# Patient Record
Sex: FEMALE | NOT HISPANIC OR LATINO
[De-identification: names, ages, dates, MRNs, and addresses within clinical notes are randomized per-mention and may not be internally consistent; named-entity substitution may affect disease eponyms.]

---

## 2018-11-27 ENCOUNTER — CHARTING TRANS (OUTPATIENT)
Dept: OTHER | Age: 23
End: 2018-11-27

## 2018-12-17 ENCOUNTER — HOSPITAL (OUTPATIENT)
Dept: OTHER | Age: 23
End: 2018-12-17
Attending: PSYCHIATRY & NEUROLOGY

## 2018-12-18 ENCOUNTER — TELEPHONE (OUTPATIENT)
Dept: NEUROLOGY | Age: 23
End: 2018-12-18

## 2019-02-06 VITALS
DIASTOLIC BLOOD PRESSURE: 66 MMHG | WEIGHT: 58.95 LBS | HEART RATE: 70 BPM | SYSTOLIC BLOOD PRESSURE: 105 MMHG | HEIGHT: 62 IN | BODY MASS INDEX: 10.85 KG/M2

## 2019-06-03 ENCOUNTER — OFFICE VISIT (OUTPATIENT)
Dept: INTERNAL MEDICINE CLINIC | Facility: CLINIC | Age: 24
End: 2019-06-03
Payer: COMMERCIAL

## 2019-06-03 VITALS
DIASTOLIC BLOOD PRESSURE: 60 MMHG | BODY MASS INDEX: 24.66 KG/M2 | WEIGHT: 134 LBS | SYSTOLIC BLOOD PRESSURE: 100 MMHG | OXYGEN SATURATION: 98 % | HEART RATE: 84 BPM | RESPIRATION RATE: 19 BRPM | TEMPERATURE: 98 F | HEIGHT: 62 IN

## 2019-06-03 DIAGNOSIS — Z3A.29 29 WEEKS GESTATION OF PREGNANCY: ICD-10-CM

## 2019-06-03 DIAGNOSIS — Z87.19 HISTORY OF GASTROESOPHAGEAL REFLUX (GERD): ICD-10-CM

## 2019-06-03 DIAGNOSIS — I34.1 MITRAL VALVE PROLAPSE: Primary | ICD-10-CM

## 2019-06-03 DIAGNOSIS — Z87.898 HISTORY OF VERTIGO: ICD-10-CM

## 2019-06-03 DIAGNOSIS — R00.2 PALPITATIONS: ICD-10-CM

## 2019-06-03 PROCEDURE — 99213 OFFICE O/P EST LOW 20 MIN: CPT | Performed by: INTERNAL MEDICINE

## 2019-06-03 RX ORDER — PRENATAL VIT/IRON FUM/FOLIC AC 27MG-0.8MG
1 TABLET ORAL DAILY
COMMUNITY
End: 2020-01-07

## 2019-06-03 NOTE — PROGRESS NOTES
Namrata Melo is a 30year old female. Patient presents with:  Establish Care: New pt in to establish care       HPI:       Cally Evan works as a . She is seven months pregnant. She has a history of mitral valve prolapse.   She of Alcohol use: Not Currently    Drug use: Never         Review of System:  CONSTITUTION: denies fevers,  chills, or sweats  HEENT: denies sore throat,  change in vision or hearing  CARDIOVASCULAR: denies chest pain, denies palpitations, denies edema  RESPIRA Miguel Cedeno.    (R00.2) Palpitations  Plan: BASIC METABOLIC PANEL (8), MAGNESIUM, EKG         12-LEAD            (W47.426) History of vertigo  Plan: Dizziness, acute onset by history with possible vasovagal etiology but will also consider hypoglycemia, arrhythmia.

## 2019-06-22 ENCOUNTER — HOSPITAL ENCOUNTER (OUTPATIENT)
Dept: CV DIAGNOSTICS | Facility: HOSPITAL | Age: 24
Discharge: HOME | End: 2019-06-22
Attending: INTERNAL MEDICINE
Payer: COMMERCIAL

## 2019-06-22 ENCOUNTER — APPOINTMENT (OUTPATIENT)
Dept: LAB | Facility: HOSPITAL | Age: 24
End: 2019-06-22
Attending: INTERNAL MEDICINE
Payer: COMMERCIAL

## 2019-06-22 DIAGNOSIS — I34.1 MITRAL VALVE PROLAPSE: ICD-10-CM

## 2019-06-22 DIAGNOSIS — R00.2 PALPITATIONS: ICD-10-CM

## 2019-06-22 DIAGNOSIS — Z3A.29 29 WEEKS GESTATION OF PREGNANCY: ICD-10-CM

## 2019-06-22 PROCEDURE — 93306 TTE W/DOPPLER COMPLETE: CPT | Performed by: INTERNAL MEDICINE

## 2019-06-22 PROCEDURE — 83735 ASSAY OF MAGNESIUM: CPT

## 2019-06-22 PROCEDURE — 93010 ELECTROCARDIOGRAM REPORT: CPT | Performed by: INTERNAL MEDICINE

## 2019-06-22 PROCEDURE — 93005 ELECTROCARDIOGRAM TRACING: CPT

## 2019-06-22 PROCEDURE — 80048 BASIC METABOLIC PNL TOTAL CA: CPT

## 2019-06-22 PROCEDURE — 36415 COLL VENOUS BLD VENIPUNCTURE: CPT

## 2019-06-23 ENCOUNTER — HOSPITAL SCAN (OUTPATIENT)
Dept: HEALTH INFORMATION MANAGEMENT | Facility: OTHER | Age: 24
End: 2019-06-23

## 2019-07-01 ENCOUNTER — OFFICE VISIT (OUTPATIENT)
Dept: INTERNAL MEDICINE CLINIC | Facility: CLINIC | Age: 24
End: 2019-07-01
Payer: COMMERCIAL

## 2019-07-01 VITALS
TEMPERATURE: 98 F | SYSTOLIC BLOOD PRESSURE: 110 MMHG | DIASTOLIC BLOOD PRESSURE: 60 MMHG | WEIGHT: 139 LBS | BODY MASS INDEX: 25.58 KG/M2 | RESPIRATION RATE: 19 BRPM | HEIGHT: 62 IN | HEART RATE: 80 BPM | OXYGEN SATURATION: 97 %

## 2019-07-01 DIAGNOSIS — I37.1 PULMONARY VALVE INSUFFICIENCY, UNSPECIFIED ETIOLOGY: ICD-10-CM

## 2019-07-01 DIAGNOSIS — E83.51 HYPOCALCEMIA: ICD-10-CM

## 2019-07-01 DIAGNOSIS — I34.0 MITRAL VALVE INSUFFICIENCY, UNSPECIFIED ETIOLOGY: Primary | ICD-10-CM

## 2019-07-01 DIAGNOSIS — Z3A.33 33 WEEKS GESTATION OF PREGNANCY: ICD-10-CM

## 2019-07-01 PROCEDURE — 99214 OFFICE O/P EST MOD 30 MIN: CPT | Performed by: INTERNAL MEDICINE

## 2019-07-01 NOTE — PROGRESS NOTES
Claudia Ash is a 30year old female. Patient presents with: Follow - Up: pt in for follow up on Echo + lab results       HPI:       Randee Sapna comes for follow-up of echocardiogram and labs. She feels well.   She has an appointment with her gynec denies nausea, emesis  : denies dysuria  MUSCULOSKELETAL: no c/o  SKIN: no problems noted  NEUROLOGICAL: no focal deficits  HEME: no unusual bleeding    Physical Exam:   07/01/19  1121   BP: 110/60   Pulse: 80   Resp: 19   Temp: 98 °F (36.7 °C)   TempSrc state, though patient also given cardiology consultation referral for expert review.    (Z3A.33) 33 weeks gestation of pregnancy  Plan: Jules Mckinnon 770        Cardiology consult given at hospital patient delivering at,tho no intervention needed at this t

## 2019-07-02 ENCOUNTER — TELEPHONE (OUTPATIENT)
Dept: CARDIOLOGY | Age: 24
End: 2019-07-02

## 2019-07-02 PROBLEM — E83.51 HYPOCALCEMIA: Status: ACTIVE | Noted: 2019-07-02

## 2019-07-02 PROBLEM — I37.1 PULMONARY VALVE INSUFFICIENCY: Status: ACTIVE | Noted: 2019-07-02

## 2019-07-02 PROBLEM — I34.0 MITRAL VALVE INSUFFICIENCY: Status: ACTIVE | Noted: 2019-07-02

## 2019-10-28 ENCOUNTER — OFFICE VISIT (OUTPATIENT)
Dept: INTERNAL MEDICINE CLINIC | Facility: CLINIC | Age: 24
End: 2019-10-28
Payer: COMMERCIAL

## 2019-10-28 VITALS
SYSTOLIC BLOOD PRESSURE: 120 MMHG | HEART RATE: 73 BPM | OXYGEN SATURATION: 97 % | WEIGHT: 126 LBS | RESPIRATION RATE: 18 BRPM | BODY MASS INDEX: 23.19 KG/M2 | DIASTOLIC BLOOD PRESSURE: 78 MMHG | HEIGHT: 62 IN

## 2019-10-28 DIAGNOSIS — R10.32 BILATERAL LOWER ABDOMINAL DISCOMFORT: ICD-10-CM

## 2019-10-28 DIAGNOSIS — I37.1 PULMONARY VALVE INSUFFICIENCY, UNSPECIFIED ETIOLOGY: ICD-10-CM

## 2019-10-28 DIAGNOSIS — E83.51 HYPOCALCEMIA: ICD-10-CM

## 2019-10-28 DIAGNOSIS — F32.A MILD EPISODE OF DEPRESSION: ICD-10-CM

## 2019-10-28 DIAGNOSIS — R10.31 BILATERAL LOWER ABDOMINAL DISCOMFORT: ICD-10-CM

## 2019-10-28 DIAGNOSIS — I34.0 MITRAL VALVE INSUFFICIENCY, UNSPECIFIED ETIOLOGY: ICD-10-CM

## 2019-10-28 DIAGNOSIS — R10.13 EPIGASTRIC DISCOMFORT: Primary | ICD-10-CM

## 2019-10-28 DIAGNOSIS — K62.5 BRBPR (BRIGHT RED BLOOD PER RECTUM): ICD-10-CM

## 2019-10-28 PROBLEM — Z87.898 HISTORY OF VERTIGO: Status: ACTIVE | Noted: 2019-10-28

## 2019-10-28 PROCEDURE — 82306 VITAMIN D 25 HYDROXY: CPT | Performed by: INTERNAL MEDICINE

## 2019-10-28 PROCEDURE — 83970 ASSAY OF PARATHORMONE: CPT | Performed by: INTERNAL MEDICINE

## 2019-10-28 PROCEDURE — 82746 ASSAY OF FOLIC ACID SERUM: CPT | Performed by: INTERNAL MEDICINE

## 2019-10-28 PROCEDURE — 84439 ASSAY OF FREE THYROXINE: CPT | Performed by: INTERNAL MEDICINE

## 2019-10-28 PROCEDURE — 99395 PREV VISIT EST AGE 18-39: CPT | Performed by: INTERNAL MEDICINE

## 2019-10-28 PROCEDURE — 82330 ASSAY OF CALCIUM: CPT | Performed by: INTERNAL MEDICINE

## 2019-10-28 PROCEDURE — 82607 VITAMIN B-12: CPT | Performed by: INTERNAL MEDICINE

## 2019-10-28 PROCEDURE — 84443 ASSAY THYROID STIM HORMONE: CPT | Performed by: INTERNAL MEDICINE

## 2019-10-28 PROCEDURE — 84100 ASSAY OF PHOSPHORUS: CPT | Performed by: INTERNAL MEDICINE

## 2019-10-28 PROCEDURE — 81003 URINALYSIS AUTO W/O SCOPE: CPT | Performed by: INTERNAL MEDICINE

## 2019-10-28 PROCEDURE — 83735 ASSAY OF MAGNESIUM: CPT | Performed by: INTERNAL MEDICINE

## 2019-10-28 NOTE — PATIENT INSTRUCTIONS
Pepcid or Prilosec or omeprazole for nausea and upset stomach, 20 md 20-30 min before breakfast and/or dinner   Exercise regularly  Avoid sweets

## 2019-10-28 NOTE — PROGRESS NOTES
Valeria Gao is a 30year old female. Patient presents with:  Physical: Annual exam today. HPI:         Feels like something  In throat, coughs up hard chunk, then feeling gone. Unable to tell if this is mucous or food.  No trouble swallowing Diabetes Paternal Grandfather       Social History    Tobacco Use      Smoking status: Never Smoker      Smokeless tobacco: Never Used    Alcohol use: Not Currently    Drug use: Never         Review of System:  CONSTITUTION: denies fevers,  chills, or swea - 8.0    Protein Urine neg Negative/Trace mg/dL    Urobilinogen Urine 0.2 0.0 - 1.9 mg/dL    Nitrite Urine neg Negative    Leukocyte Esterase Urine neg Negative    APPEARANCE clear Clear    Color Urine yellow Yellow    Multistix Lot# [id number] Numeric    Mul PATIENT,         MAGNESIUM        Hypocalcemia on previous labs.   To check above, check thyroid functions    '        Imaging & Consults:  CARDIO - [address] for this Visit:   Requested Prescriptions      No prescription

## 2019-11-25 ENCOUNTER — OFFICE VISIT (OUTPATIENT)
Dept: INTERNAL MEDICINE CLINIC | Facility: CLINIC | Age: 24
End: 2019-11-25
Payer: COMMERCIAL

## 2019-11-25 VITALS
OXYGEN SATURATION: 98 % | DIASTOLIC BLOOD PRESSURE: 78 MMHG | RESPIRATION RATE: 19 BRPM | HEIGHT: 62 IN | SYSTOLIC BLOOD PRESSURE: 120 MMHG | BODY MASS INDEX: 22.26 KG/M2 | WEIGHT: 121 LBS | HEART RATE: 89 BPM | TEMPERATURE: 98 F

## 2019-11-25 DIAGNOSIS — E83.52 HYPERCALCEMIA: ICD-10-CM

## 2019-11-25 DIAGNOSIS — R10.32 BILATERAL LOWER ABDOMINAL DISCOMFORT: ICD-10-CM

## 2019-11-25 DIAGNOSIS — I34.0 MITRAL VALVE INSUFFICIENCY, UNSPECIFIED ETIOLOGY: ICD-10-CM

## 2019-11-25 DIAGNOSIS — K62.5 BRBPR (BRIGHT RED BLOOD PER RECTUM): ICD-10-CM

## 2019-11-25 DIAGNOSIS — R10.13 EPIGASTRIC DISCOMFORT: Primary | ICD-10-CM

## 2019-11-25 DIAGNOSIS — R10.31 BILATERAL LOWER ABDOMINAL DISCOMFORT: ICD-10-CM

## 2019-11-25 DIAGNOSIS — R00.2 PALPITATIONS: ICD-10-CM

## 2019-11-25 DIAGNOSIS — Z87.19 HISTORY OF GASTROESOPHAGEAL REFLUX (GERD): ICD-10-CM

## 2019-11-25 DIAGNOSIS — E55.9 VITAMIN D DEFICIENCY: ICD-10-CM

## 2019-11-25 PROCEDURE — 99214 OFFICE O/P EST MOD 30 MIN: CPT | Performed by: INTERNAL MEDICINE

## 2019-11-25 PROCEDURE — 82274 ASSAY TEST FOR BLOOD FECAL: CPT | Performed by: INTERNAL MEDICINE

## 2019-11-25 RX ORDER — ALBUTEROL SULFATE 90 UG/1
AEROSOL, METERED RESPIRATORY (INHALATION)
COMMUNITY
End: 2020-01-20

## 2019-11-25 NOTE — PROGRESS NOTES
Salvador Andujar is a 30year old female. Patient presents with:   Follow - Up: pt in for follow up       HPI:         Ej complaints of bright red blood per rectum recently also, usually less than 1 teaspoon, often associated with lower abdominal di Paternal Grandfather       Social History    Tobacco Use      Smoking status: Never Smoker      Smokeless tobacco: Never Used    Alcohol use: Not Currently    Drug use: Never         Review of System:  CONSTITUTION: denies fevers,  chills, or sweats   HEEN consultation    (K62.5) BRBPR (bright red blood per rectum)  Plan: GI referral, especially due to associated hypogastric discomfort with bleeding. Patient does not wish rectal exam, tho told she will get this future.    (R10.31,  R10.32) Bilateral lower abd

## 2019-12-06 ENCOUNTER — APPOINTMENT (OUTPATIENT)
Dept: LAB | Facility: HOSPITAL | Age: 24
End: 2019-12-06
Attending: INTERNAL MEDICINE
Payer: COMMERCIAL

## 2019-12-06 DIAGNOSIS — R10.13 EPIGASTRIC DISCOMFORT: ICD-10-CM

## 2019-12-06 PROCEDURE — 87338 HPYLORI STOOL AG IA: CPT

## 2020-01-07 PROBLEM — R00.2 PALPITATIONS: Status: ACTIVE | Noted: 2020-01-07

## 2020-01-20 ENCOUNTER — OFFICE VISIT (OUTPATIENT)
Dept: INTERNAL MEDICINE CLINIC | Facility: CLINIC | Age: 25
End: 2020-01-20
Payer: COMMERCIAL

## 2020-01-20 VITALS
HEIGHT: 62 IN | TEMPERATURE: 97 F | SYSTOLIC BLOOD PRESSURE: 110 MMHG | WEIGHT: 120 LBS | OXYGEN SATURATION: 98 % | HEART RATE: 80 BPM | BODY MASS INDEX: 22.08 KG/M2 | DIASTOLIC BLOOD PRESSURE: 64 MMHG

## 2020-01-20 DIAGNOSIS — Z87.19 HISTORY OF GASTROESOPHAGEAL REFLUX (GERD): ICD-10-CM

## 2020-01-20 DIAGNOSIS — Z23 IMMUNIZATION DUE: ICD-10-CM

## 2020-01-20 DIAGNOSIS — K62.5 BRBPR (BRIGHT RED BLOOD PER RECTUM): ICD-10-CM

## 2020-01-20 DIAGNOSIS — R10.32 BILATERAL LOWER ABDOMINAL DISCOMFORT: ICD-10-CM

## 2020-01-20 DIAGNOSIS — E83.51 HYPOCALCEMIA: Primary | ICD-10-CM

## 2020-01-20 DIAGNOSIS — I34.1 MITRAL VALVE PROLAPSE: ICD-10-CM

## 2020-01-20 DIAGNOSIS — R10.31 BILATERAL LOWER ABDOMINAL DISCOMFORT: ICD-10-CM

## 2020-01-20 PROCEDURE — 99214 OFFICE O/P EST MOD 30 MIN: CPT | Performed by: INTERNAL MEDICINE

## 2020-01-20 RX ORDER — ALBUTEROL SULFATE 90 UG/1
AEROSOL, METERED RESPIRATORY (INHALATION)
Qty: 1 INHALER | Refills: 2 | Status: SHIPPED | OUTPATIENT
Start: 2020-01-20 | End: 2020-12-07

## 2020-01-20 NOTE — PROGRESS NOTES
Jarvis Senior is a 30year old female. Patient presents with: Follow - Up: pt in for follow up visit       HPI:         Works in veterinary office, 4 days weekly. [de-identified] 5 months old, doing well. Patient feels well.       Wishes female GI physicia denies fevers,  chills, or sweats; no dizziness  HEENT: denies sore throat,  change in vision or hearing  CARDIOVASCULAR: denies chest pain, denies palpitations  RESPIRATORY: denies shortness of breath, denies wheezing  GASTROINTESTINAL: denies abdominal p Patient agrees to make appointment with GI for evaluation of this also    (Z23) Immunization due  Plan: FLULAVAL INFLUENZA VACCINE QUAD PRESERVATIVE         FREE 0.5 ML                Imaging & Consults:  FLULAVAL INFLUENZA VACCINE QUAD PRESERVATIVE FREE 0

## 2020-01-21 PROBLEM — R00.2 PALPITATIONS: Status: RESOLVED | Noted: 2020-01-07 | Resolved: 2020-01-21

## 2020-01-27 ENCOUNTER — OFFICE VISIT (OUTPATIENT)
Dept: INTERNAL MEDICINE CLINIC | Facility: CLINIC | Age: 25
End: 2020-01-27
Payer: COMMERCIAL

## 2020-01-27 VITALS
WEIGHT: 122 LBS | HEIGHT: 62 IN | SYSTOLIC BLOOD PRESSURE: 106 MMHG | HEART RATE: 88 BPM | OXYGEN SATURATION: 100 % | TEMPERATURE: 99 F | BODY MASS INDEX: 22.45 KG/M2 | DIASTOLIC BLOOD PRESSURE: 62 MMHG

## 2020-01-27 DIAGNOSIS — R11.0 NAUSEA: ICD-10-CM

## 2020-01-27 DIAGNOSIS — R09.81 SINUS CONGESTION: ICD-10-CM

## 2020-01-27 DIAGNOSIS — J11.1 INFLUENZA: Primary | ICD-10-CM

## 2020-01-27 PROCEDURE — 99213 OFFICE O/P EST LOW 20 MIN: CPT | Performed by: INTERNAL MEDICINE

## 2020-01-27 RX ORDER — OSELTAMIVIR PHOSPHATE 75 MG/1
75 CAPSULE ORAL 2 TIMES DAILY
Qty: 10 CAPSULE | Refills: 0 | Status: SHIPPED | OUTPATIENT
Start: 2020-01-27 | End: 2020-03-23 | Stop reason: ALTCHOICE

## 2020-01-27 RX ORDER — AMPICILLIN 500 MG/1
500 CAPSULE ORAL 3 TIMES DAILY
Qty: 30 CAPSULE | Refills: 0 | Status: SHIPPED | OUTPATIENT
Start: 2020-01-27 | End: 2020-03-23 | Stop reason: ALTCHOICE

## 2020-01-27 NOTE — PROGRESS NOTES
Eneida Walton is a 30year old female. Patient presents with:  Sinusitis: pt in c/o sinus pressure, headache, fever, body aches since last night       HPI:       Yesterday had burning feeling right side nose, then temp to 100.  Nause status: Never Smoker      Smokeless tobacco: Never Used    Alcohol use: Not Currently    Drug use: Never         Review of System:  CONSTITUTION:as above  HEENT: as above  CARDIOVASCULAR: denies chest pain,  palpitations,  RESPIRATORY:no cough, no shortnes ampicillin 500 MG Oral Cap 30 capsule 0     Sig: Take 1 capsule (500 mg total) by mouth 3 (three) times daily. • Oseltamivir Phosphate (TAMIFLU) 75 MG Oral Cap 10 capsule 0     Sig: Take 1 capsule (75 mg total) by mouth 2 (two) times daily.        Instruc

## 2020-01-28 ENCOUNTER — TELEPHONE (OUTPATIENT)
Dept: INTERNAL MEDICINE CLINIC | Facility: CLINIC | Age: 25
End: 2020-01-28

## 2020-01-28 NOTE — TELEPHONE ENCOUNTER
Spoke with pt. C/o nausea and fever this morning of 100. Pt took tamiflu and ampicillin last night 1 hour apart w/ a bowl of soup. Pt woke up with stomach pain this morning, hasn't had anything to eat, and has not taken any meds.    Please call pt and advi

## 2020-01-28 NOTE — TELEPHONE ENCOUNTER
Pt believe she is having a reaction to her Rx that she was given yesterday, and Mom called but is not sure of which one. Please give her a call because she is having stomach pains.

## 2020-01-30 ENCOUNTER — PATIENT MESSAGE (OUTPATIENT)
Dept: INTERNAL MEDICINE CLINIC | Facility: CLINIC | Age: 25
End: 2020-01-30

## 2020-03-23 ENCOUNTER — OFFICE VISIT (OUTPATIENT)
Dept: INTERNAL MEDICINE CLINIC | Facility: CLINIC | Age: 25
End: 2020-03-23
Payer: COMMERCIAL

## 2020-03-23 VITALS
DIASTOLIC BLOOD PRESSURE: 60 MMHG | HEART RATE: 83 BPM | OXYGEN SATURATION: 99 % | BODY MASS INDEX: 22.45 KG/M2 | RESPIRATION RATE: 19 BRPM | WEIGHT: 122 LBS | SYSTOLIC BLOOD PRESSURE: 102 MMHG | HEIGHT: 62 IN | TEMPERATURE: 98 F

## 2020-03-23 DIAGNOSIS — F41.9 ANXIETY: ICD-10-CM

## 2020-03-23 DIAGNOSIS — Z87.19 HISTORY OF GASTROESOPHAGEAL REFLUX (GERD): ICD-10-CM

## 2020-03-23 DIAGNOSIS — I34.1 MITRAL VALVE PROLAPSE: ICD-10-CM

## 2020-03-23 DIAGNOSIS — M79.18 MUSCULOSKELETAL PAIN: Primary | ICD-10-CM

## 2020-03-23 PROBLEM — J45.20 MILD INTERMITTENT ASTHMA WITHOUT COMPLICATION (HCC): Status: ACTIVE | Noted: 2020-03-23

## 2020-03-23 PROBLEM — J45.20 MILD INTERMITTENT ASTHMA WITHOUT COMPLICATION: Status: ACTIVE | Noted: 2020-03-23

## 2020-03-23 PROCEDURE — 99213 OFFICE O/P EST LOW 20 MIN: CPT | Performed by: INTERNAL MEDICINE

## 2020-03-23 RX ORDER — FAMOTIDINE 20 MG/1
20 TABLET ORAL
COMMUNITY
End: 2020-07-15 | Stop reason: ALTCHOICE

## 2020-03-23 NOTE — PROGRESS NOTES
Francisco J Bella is a 30year old female.   Patient presents with:  Breast Pain: pt in c./o pain under left breast only when breaths in       HPI:        For 2 weeks had pain under medial left breast, sharp, stabbing pain, when inhales, yawns, or or i Diabetes Maternal Grandfather    • Diabetes Paternal Grandmother    • Diabetes Paternal Grandfather       Social History    Tobacco Use      Smoking status: Never Smoker      Smokeless tobacco: Never Used    Alcohol use: Not Currently    Drug use: Never Assessment/Plan:    (M79.18) Musculoskeletal pain  (primary encounter diagnosis)  Plan: Pain likely due to torn cartilage, musculoskeletal etiology. No evidence of pleurisy with rub.   Advised Tylenol as needed, call if no resolution though may take

## 2020-05-26 ENCOUNTER — TELEMEDICINE (OUTPATIENT)
Dept: INTERNAL MEDICINE CLINIC | Facility: CLINIC | Age: 25
End: 2020-05-26

## 2020-05-26 DIAGNOSIS — R21 RASH AND NONSPECIFIC SKIN ERUPTION: Primary | ICD-10-CM

## 2020-05-26 PROCEDURE — 99213 OFFICE O/P EST LOW 20 MIN: CPT | Performed by: INTERNAL MEDICINE

## 2020-05-26 RX ORDER — PREDNISONE 10 MG/1
TABLET ORAL
Qty: 6 TABLET | Refills: 0 | Status: SHIPPED | OUTPATIENT
Start: 2020-05-26 | End: 2021-06-18 | Stop reason: ALTCHOICE

## 2020-05-26 NOTE — PROGRESS NOTES
Arian Guillen is a 30year old female. No chief complaint on file. HPI:         This is of video visit.     Patient was outside yesterday for approximately 20 minutes; weather was very warm, marion, humid, in upper 80s-felt very nauseated last • Recurrent UTI     less often mow rthan in past      Past Surgical History:   Procedure Laterality Date   • HC IMPLANT EAR TUBES      from mastoiditis      Family History   Problem Relation Age of Onset   • Hypertension Father    • Hypertension Mother and nonspecific skin eruption  (primary encounter diagnosis)    Diffuse rash, mainly arms and legs though also trunk, with no specific inciting etiology although patient was out in the sun with heat and humidity.   No exposure to poison ivy or poison oak no

## 2020-07-13 ENCOUNTER — OFFICE VISIT (OUTPATIENT)
Dept: INTERNAL MEDICINE CLINIC | Facility: CLINIC | Age: 25
End: 2020-07-13
Payer: COMMERCIAL

## 2020-07-13 VITALS
BODY MASS INDEX: 22.63 KG/M2 | TEMPERATURE: 97 F | OXYGEN SATURATION: 98 % | HEIGHT: 62 IN | WEIGHT: 123 LBS | SYSTOLIC BLOOD PRESSURE: 106 MMHG | RESPIRATION RATE: 19 BRPM | HEART RATE: 70 BPM | DIASTOLIC BLOOD PRESSURE: 66 MMHG

## 2020-07-13 DIAGNOSIS — K21.9 GASTROESOPHAGEAL REFLUX DISEASE, ESOPHAGITIS PRESENCE NOT SPECIFIED: Primary | ICD-10-CM

## 2020-07-13 DIAGNOSIS — I34.0 MITRAL VALVE INSUFFICIENCY, UNSPECIFIED ETIOLOGY: ICD-10-CM

## 2020-07-13 DIAGNOSIS — M54.12 CERVICAL RADICULOPATHY: ICD-10-CM

## 2020-07-13 PROCEDURE — 3078F DIAST BP <80 MM HG: CPT | Performed by: INTERNAL MEDICINE

## 2020-07-13 PROCEDURE — 99214 OFFICE O/P EST MOD 30 MIN: CPT | Performed by: INTERNAL MEDICINE

## 2020-07-13 PROCEDURE — 3008F BODY MASS INDEX DOCD: CPT | Performed by: INTERNAL MEDICINE

## 2020-07-13 PROCEDURE — 3074F SYST BP LT 130 MM HG: CPT | Performed by: INTERNAL MEDICINE

## 2020-07-13 RX ORDER — MOMETASONE 50 UG/1
2 SPRAY, METERED NASAL DAILY
Qty: 1 BOTTLE | Refills: 0 | Status: SHIPPED | OUTPATIENT
Start: 2020-07-13 | End: 2020-08-10

## 2020-07-13 NOTE — PROGRESS NOTES
Anna Topete is a 30year old female. Patient presents with: Follow - Up: pt in for 6 month follow up      HPI:         Still GERD, epig pain morning and evening. Wakes up with sore throat, with stuffy nose.   Allergy pollen  Famotidine PRN  Men Past Surgical History:   Procedure Laterality Date   • HC IMPLANT EAR TUBES      from mastoiditis      Family History   Problem Relation Age of Onset   • Hypertension Father    • Hypertension Mother    • Diabetes Maternal Grandmother         CHF   • D Assessment/Plan:    (K21.9) Gastroesophageal reflux disease, esophagitis presence not specified  (primary encounter diagnosis)  Plan: Not breast-feeding.   Pantoprazole 40 mg daily up to 6 weeks    (I34.0) Mitral valve insufficiency, unspecified etiol

## 2020-07-15 RX ORDER — PANTOPRAZOLE SODIUM 40 MG/1
40 TABLET, DELAYED RELEASE ORAL
Qty: 42 TABLET | Refills: 0 | Status: SHIPPED | OUTPATIENT
Start: 2020-07-15 | End: 2020-08-19

## 2020-07-18 ENCOUNTER — HOSPITAL ENCOUNTER (OUTPATIENT)
Dept: GENERAL RADIOLOGY | Facility: HOSPITAL | Age: 25
Discharge: HOME | End: 2020-07-18
Attending: INTERNAL MEDICINE
Payer: COMMERCIAL

## 2020-07-18 DIAGNOSIS — M54.12 CERVICAL RADICULOPATHY: ICD-10-CM

## 2020-07-18 PROCEDURE — 72050 X-RAY EXAM NECK SPINE 4/5VWS: CPT | Performed by: INTERNAL MEDICINE

## 2020-08-03 ENCOUNTER — OFFICE VISIT (OUTPATIENT)
Dept: INTERNAL MEDICINE CLINIC | Facility: CLINIC | Age: 25
End: 2020-08-03
Payer: COMMERCIAL

## 2020-08-03 VITALS
DIASTOLIC BLOOD PRESSURE: 64 MMHG | SYSTOLIC BLOOD PRESSURE: 110 MMHG | WEIGHT: 124.19 LBS | HEIGHT: 62 IN | TEMPERATURE: 98 F | BODY MASS INDEX: 22.85 KG/M2

## 2020-08-03 DIAGNOSIS — K21.9 GASTROESOPHAGEAL REFLUX DISEASE, ESOPHAGITIS PRESENCE NOT SPECIFIED: ICD-10-CM

## 2020-08-03 DIAGNOSIS — M54.12 CERVICAL RADICULOPATHY: Primary | ICD-10-CM

## 2020-08-03 DIAGNOSIS — R20.9 ABNORMAL SENSATION OF LEG: ICD-10-CM

## 2020-08-03 PROCEDURE — 3078F DIAST BP <80 MM HG: CPT | Performed by: INTERNAL MEDICINE

## 2020-08-03 PROCEDURE — 3074F SYST BP LT 130 MM HG: CPT | Performed by: INTERNAL MEDICINE

## 2020-08-03 PROCEDURE — 99214 OFFICE O/P EST MOD 30 MIN: CPT | Performed by: INTERNAL MEDICINE

## 2020-08-03 PROCEDURE — 3008F BODY MASS INDEX DOCD: CPT | Performed by: INTERNAL MEDICINE

## 2020-08-03 RX ORDER — CELECOXIB 200 MG/1
200 CAPSULE ORAL DAILY
Qty: 10 CAPSULE | Refills: 0 | Status: SHIPPED | OUTPATIENT
Start: 2020-08-03 | End: 2020-08-03 | Stop reason: ALTCHOICE

## 2020-08-03 RX ORDER — MELOXICAM 7.5 MG/1
7.5 TABLET ORAL DAILY
Qty: 10 TABLET | Refills: 0 | Status: SHIPPED | OUTPATIENT
Start: 2020-08-03 | End: 2020-12-07

## 2020-08-03 NOTE — PROGRESS NOTES
Arnav Cota is a 30year old female. Patient presents with: Follow - Up      HPI:       Pain neck lower neck on left going down left arm going to left thumb at times, sometimes tip fingers left numb. Weakness.   However she has a 3year-old ba every 4 hours PRN bronchospasm 1 Inhaler 2   • Multiple Vitamins-Minerals (ONE-A-DAY 50 PLUS OR) One A Day   1 daily  pre     vitamin     • predniSONE 10 MG Oral Tab 2 tablets p.o. daily, preferably in the a.m. or early afternoon, for rash 6 tablet 0 auscultation   Heart-S1-S2 normal, no S3 or murmur. Rhythm regular. Abdomen-bowel sounds normal, no organomegaly, no tenderness to palpation. No masses.   Extremities-no cyanosis clubbing or edema    Objectives:  Results for orders placed or performed in EEG    (K21.9) Gastroesophageal reflux disease, esophagitis presence not specified  Plan: Resolved with pantoprazole        Imaging & Consults:  NEURO - INTERNAL  MRI SPINE CERVICAL (CPT=72141)    Meds & Refills for this Visit:   Requested Prescriptions

## 2020-08-04 PROBLEM — R20.9 ABNORMAL SENSATION OF LEG: Status: ACTIVE | Noted: 2020-08-04

## 2020-08-04 PROBLEM — M54.12 CERVICAL RADICULOPATHY: Status: ACTIVE | Noted: 2020-08-04

## 2020-08-04 PROBLEM — K21.9 GASTROESOPHAGEAL REFLUX DISEASE: Status: ACTIVE | Noted: 2020-08-04

## 2020-08-10 RX ORDER — MOMETASONE 50 UG/1
SPRAY, METERED NASAL
Qty: 1 BOTTLE | Refills: 0 | Status: SHIPPED | OUTPATIENT
Start: 2020-08-10

## 2020-08-15 ENCOUNTER — HOSPITAL ENCOUNTER (OUTPATIENT)
Dept: MRI IMAGING | Facility: HOSPITAL | Age: 25
Discharge: HOME | End: 2020-08-15
Attending: INTERNAL MEDICINE
Payer: COMMERCIAL

## 2020-08-15 DIAGNOSIS — R20.9 ABNORMAL SENSATION OF LEG: ICD-10-CM

## 2020-08-15 DIAGNOSIS — M54.12 CERVICAL RADICULOPATHY: ICD-10-CM

## 2020-08-15 PROCEDURE — 72141 MRI NECK SPINE W/O DYE: CPT | Performed by: INTERNAL MEDICINE

## 2020-08-16 ENCOUNTER — TELEPHONE (OUTPATIENT)
Dept: INTERNAL MEDICINE CLINIC | Facility: CLINIC | Age: 25
End: 2020-08-16

## 2020-08-16 DIAGNOSIS — M54.12 CERVICAL RADICULOPATHY: Primary | ICD-10-CM

## 2020-08-16 NOTE — TELEPHONE ENCOUNTER
Left message that she does have a disc in her neck which is bulging and pressing on her sac of nerves, which could cause her symptoms. I can refer her to neurology for further evaluation, and likely refer her to physical therapy for this.   She can call kalli

## 2020-08-19 RX ORDER — PANTOPRAZOLE SODIUM 40 MG/1
TABLET, DELAYED RELEASE ORAL
Qty: 90 TABLET | Refills: 1 | Status: SHIPPED | OUTPATIENT
Start: 2020-08-19 | End: 2020-12-07

## 2020-08-20 ENCOUNTER — TELEPHONE (OUTPATIENT)
Dept: INTERNAL MEDICINE CLINIC | Facility: CLINIC | Age: 25
End: 2020-08-20

## 2020-08-20 DIAGNOSIS — Z20.822 EXPOSURE TO COVID-19 VIRUS: Primary | ICD-10-CM

## 2020-08-20 NOTE — TELEPHONE ENCOUNTER
Pt was called b/c there are people who were postive for Covid at her job. So pt needs an order put in. Exposure at work to people who tested positive for Covid. Has had headache, no fever, tho slight cough, sinus symptoms.   Has young baby

## 2020-08-25 ENCOUNTER — APPOINTMENT (OUTPATIENT)
Dept: LAB | Facility: HOSPITAL | Age: 25
End: 2020-08-25
Attending: INTERNAL MEDICINE
Payer: COMMERCIAL

## 2020-08-25 DIAGNOSIS — Z20.822 EXPOSURE TO COVID-19 VIRUS: ICD-10-CM

## 2020-08-27 LAB — SARS-COV-2 RNA RESP QL NAA+PROBE: NOT DETECTED

## 2020-10-21 ENCOUNTER — OFFICE VISIT (OUTPATIENT)
Dept: FAMILY MEDICINE CLINIC | Facility: CLINIC | Age: 25
End: 2020-10-21
Payer: COMMERCIAL

## 2020-10-21 VITALS
HEART RATE: 84 BPM | SYSTOLIC BLOOD PRESSURE: 130 MMHG | DIASTOLIC BLOOD PRESSURE: 80 MMHG | TEMPERATURE: 98 F | RESPIRATION RATE: 18 BRPM | OXYGEN SATURATION: 99 %

## 2020-10-21 DIAGNOSIS — Z20.822 ENCOUNTER FOR SCREENING LABORATORY TESTING FOR COVID-19 VIRUS: Primary | ICD-10-CM

## 2020-10-21 PROCEDURE — 3079F DIAST BP 80-89 MM HG: CPT | Performed by: NURSE PRACTITIONER

## 2020-10-21 PROCEDURE — 3075F SYST BP GE 130 - 139MM HG: CPT | Performed by: NURSE PRACTITIONER

## 2020-10-21 PROCEDURE — U0003 INFECTIOUS AGENT DETECTION BY NUCLEIC ACID (DNA OR RNA); SEVERE ACUTE RESPIRATORY SYNDROME CORONAVIRUS 2 (SARS-COV-2) (CORONAVIRUS DISEASE [COVID-19]), AMPLIFIED PROBE TECHNIQUE, MAKING USE OF HIGH THROUGHPUT TECHNOLOGIES AS DESCRIBED BY CMS-2020-01-R: HCPCS | Performed by: NURSE PRACTITIONER

## 2020-10-21 PROCEDURE — 99212 OFFICE O/P EST SF 10 MIN: CPT | Performed by: NURSE PRACTITIONER

## 2020-10-21 NOTE — PATIENT INSTRUCTIONS
Coronavirus Disease 2019 (COVID-19)     [facility] is committed to the safety and well-being of our patients, members, employees, and communities.  As concerns arise about the new strain of coronavirus that causes COVID-19, [facility] 4. If you have a medical appointment, call the healthcare provider ahead of time and tell them that you have or may have COVID-19.  5. For medical emergencies, call 911 and notify the dispatch personnel that you have or may have COVID-19.   6. Cover your c · At least 10 days have passed since symptoms first appeared OR if asymptomatic patient or date of symptom onset is unclear then use 10 days post COVID test date.    · At least 20 days have passed for severe illness (requiring hospitalization) OR if you are *Some people will be required to have a repeat COVID-19 test in order to be eligible to donate. If you’re instructed by Louis Silverman that a repeat test is required, please contact the [facility] COVID-19 Nurse Triage Line at [phone].     Additional Inf Public health officials are working to find the source. How the virus spreads is not yet fully understood, but it seems to spread and infect people fairly easily.  Some people who have been infected in an area may not be sure how or where they were infected As experts learn more about OJQVA-28, other complications are being reported that may be linked to COVID-19. Rarely, some children have developed severe complications called multisystem inflammatory syndrome in children (MIS-C).  MIS-C seems to be similar t · Antibody blood test.  Antibody tests are being looked at to find out if a person has previously been infected with the virus and may now have antibodies such as SARS AB IgG in their blood to give some immunity.  The accuracy and availability of antibody t · Oxygen. You may be given supplemental oxygen or ventilation with a breathing machine (ventilator). This is done so you get enough oxygen in your body. · Prone positioning.   Depending on how sick you are during your hospital stay, your healthcare team ma © 7575-3807 [address] [facility] [place] [facility] [place]. All rights reserved. This information is not intended as a substitute for professional medical care. Always follow your healthcare professional's instructions.

## 2020-10-21 NOTE — PROGRESS NOTES
CHIEF COMPLAINT:   Patient presents with: Other: covid exposure      HPI:   Eneida Walton is a 30year old female who presents for Covid 19 exposure 4-5 days ago. Reports no symptoms. Requesting covid testing.   At this time, not experiencing upp GI: denies N/V/C or abdominal pain  NEURO: Denies headaches    EXAM:   /80   Pulse 84   Temp 98 °F (36.7 °C) (Tympanic)   Resp 18   LMP 10/15/2020   SpO2 99%   GENERAL: well developed, well nourished, in no apparent distress  SKIN: no rashes,no suspi Please review the entirety of this informational document. It includes information related to exposure, pending tests, positive results, aftercare, and plasma donation. There is no specific antiviral treatment recommended for COVID-19.  People with COVI 7. Wash your hands often with soap and water for at least 20 seconds or clean your hands with an alcohol-based hand  that contains at least 60% alcohol. 8. As much as possible, stay in a specific room and away from other people in your home.  Anca Mohan If you have a fever with cough or shortness of breath but have not been exposed to someone with COVID-19 and have not tested positive for COVID-19, you should also stay home and away from others for a total of 10 days after your symptoms started, and at [employer] You can also get more information at the following websites:   Centers for Disease Control & Prevention (CDC)  What to do if you are sick with coronavirus disease 2019, [employer].com.pt. pdf For the latest information, visit the CDC website at www.cdc.gov/coronavirus/2019-ncov. Or call 094-CNK-EWLR (075-332-7582). What are the symptoms of COVID-19? Some people have no symptoms or mild symptoms. Symptoms can also vary from person to person. As experts learn more about YWARM-18, other complications are being reported that may be linked to COVID-19. Rarely, some children have developed severe complications called multisystem inflammatory syndrome in children (MIS-C).  MIS-C seems to be similar t · Antibody blood test.  Antibody tests are being looked at to find out if a person has previously been infected with the virus and may now have antibodies such as SARS AB IgG in their blood to give some immunity.  The accuracy and availability of antibody t · Oxygen. You may be given supplemental oxygen or ventilation with a breathing machine (ventilator). This is done so you get enough oxygen in your body. · Prone positioning.   Depending on how sick you are during your hospital stay, your healthcare team ma © 1178-1631 [address]. [address] [facility] [place], [facility] [place]. All rights reserved. This information is not intended as a substitute for professional medical care. Always follow your healthcare professional's instructions.

## 2020-12-07 ENCOUNTER — OFFICE VISIT (OUTPATIENT)
Dept: INTERNAL MEDICINE CLINIC | Facility: CLINIC | Age: 25
End: 2020-12-07
Payer: COMMERCIAL

## 2020-12-07 VITALS
SYSTOLIC BLOOD PRESSURE: 112 MMHG | BODY MASS INDEX: 23 KG/M2 | DIASTOLIC BLOOD PRESSURE: 70 MMHG | WEIGHT: 125 LBS | HEIGHT: 62 IN

## 2020-12-07 DIAGNOSIS — M54.12 CERVICAL RADICULOPATHY: Primary | ICD-10-CM

## 2020-12-07 DIAGNOSIS — K21.9 GASTROESOPHAGEAL REFLUX DISEASE WITHOUT ESOPHAGITIS: ICD-10-CM

## 2020-12-07 PROCEDURE — 3074F SYST BP LT 130 MM HG: CPT | Performed by: INTERNAL MEDICINE

## 2020-12-07 PROCEDURE — 3008F BODY MASS INDEX DOCD: CPT | Performed by: INTERNAL MEDICINE

## 2020-12-07 PROCEDURE — 99214 OFFICE O/P EST MOD 30 MIN: CPT | Performed by: INTERNAL MEDICINE

## 2020-12-07 PROCEDURE — 3078F DIAST BP <80 MM HG: CPT | Performed by: INTERNAL MEDICINE

## 2020-12-07 RX ORDER — PANTOPRAZOLE SODIUM 40 MG/1
40 TABLET, DELAYED RELEASE ORAL
Qty: 90 TABLET | Refills: 1 | Status: SHIPPED | OUTPATIENT
Start: 2020-12-07 | End: 2021-08-09

## 2020-12-07 NOTE — PROGRESS NOTES
HPI:    Patient ID: Sal Younger is a 30year old female.     HPI    Review of Systems         Current Outpatient Medications   Medication Sig Dispense Refill   • PANTOPRAZOLE SODIUM 40 MG Oral Tab EC TAKE 1 TABLET BY MOUTH EVERY MORNING IF POSSIBLE

## 2020-12-07 NOTE — PROGRESS NOTES
HPI:    Patient ID: Francisco J Bella is a 30year old female. HPI Pt here for getting established as a new pt. .  Has issues with cervical radiculopathy and GERD. Is seeing neurology for a consult re cervical problem. Has multiple food allergies.   Ellie Flores Pulmonary/Chest: Effort normal and breath sounds normal.   Abdominal: There is no abdominal tenderness. Neurological: She is alert and oriented to person, place, and time. Skin: No rash noted.    Psychiatric: Her behavior is normal.              ASSES

## 2020-12-08 ENCOUNTER — TELEPHONE (OUTPATIENT)
Dept: NEUROLOGY | Facility: CLINIC | Age: 25
End: 2020-12-08

## 2021-02-17 ENCOUNTER — OFFICE VISIT (OUTPATIENT)
Dept: NEUROLOGY | Facility: CLINIC | Age: 26
End: 2021-02-17
Payer: COMMERCIAL

## 2021-02-17 VITALS
WEIGHT: 125 LBS | DIASTOLIC BLOOD PRESSURE: 76 MMHG | BODY MASS INDEX: 22.15 KG/M2 | HEIGHT: 63 IN | SYSTOLIC BLOOD PRESSURE: 118 MMHG

## 2021-02-17 DIAGNOSIS — M54.12 CERVICAL RADICULOPATHY: Primary | ICD-10-CM

## 2021-02-17 PROCEDURE — 3008F BODY MASS INDEX DOCD: CPT | Performed by: OTHER

## 2021-02-17 PROCEDURE — 3078F DIAST BP <80 MM HG: CPT | Performed by: OTHER

## 2021-02-17 PROCEDURE — 3074F SYST BP LT 130 MM HG: CPT | Performed by: OTHER

## 2021-02-17 PROCEDURE — 99204 OFFICE O/P NEW MOD 45 MIN: CPT | Performed by: OTHER

## 2021-02-17 NOTE — PROGRESS NOTES
Ms John Chavez, relates a 5-year history of cervical spine and left extensor forearm pain, paresthesias in the left second third fourth digit. No pain between the elbow and shoulder. About once a month become significantly increased in intensity.   No wea EVERY DAY 1 Bottle 0   • predniSONE 10 MG Oral Tab 2 tablets p.o. daily, preferably in the a.m. or early afternoon, for rash 6 tablet 0   • Multiple Vitamins-Minerals (ONE-A-DAY 50 PLUS OR) One A Day   1 daily  pre     vitamin        Past Medical Hist kg/m²    CV: No  Evidence of Carotid Bruits, Regular Rate and Rhythm  GI: + Bowel sounds, soft. Resp: Clear, no Wheezes  Extremities: NO edema, no erythema.   Neuro:  Higher Integrative Functions:  Alert, Oriented *3, Fluent, conversational, repetition an

## 2021-03-08 ENCOUNTER — ORDER TRANSCRIPTION (OUTPATIENT)
Dept: PHYSICAL THERAPY | Age: 26
End: 2021-03-08

## 2021-03-08 ENCOUNTER — TELEPHONE (OUTPATIENT)
Dept: PHYSICAL THERAPY | Age: 26
End: 2021-03-08

## 2021-03-08 DIAGNOSIS — M54.12 CERVICAL RADICULOPATHY: Primary | ICD-10-CM

## 2021-03-09 ENCOUNTER — TELEPHONE (OUTPATIENT)
Dept: PHYSICAL THERAPY | Facility: HOSPITAL | Age: 26
End: 2021-03-09

## 2021-03-09 ENCOUNTER — APPOINTMENT (OUTPATIENT)
Dept: PHYSICAL THERAPY | Facility: HOSPITAL | Age: 26
End: 2021-03-09
Attending: Other
Payer: COMMERCIAL

## 2021-03-16 ENCOUNTER — APPOINTMENT (OUTPATIENT)
Dept: PHYSICAL THERAPY | Facility: HOSPITAL | Age: 26
End: 2021-03-16
Attending: Other
Payer: COMMERCIAL

## 2021-03-18 ENCOUNTER — APPOINTMENT (OUTPATIENT)
Dept: PHYSICAL THERAPY | Facility: HOSPITAL | Age: 26
End: 2021-03-18
Attending: Other
Payer: COMMERCIAL

## 2021-03-18 ENCOUNTER — TELEPHONE (OUTPATIENT)
Dept: PHYSICAL THERAPY | Facility: HOSPITAL | Age: 26
End: 2021-03-18

## 2021-03-23 ENCOUNTER — OFFICE VISIT (OUTPATIENT)
Dept: PHYSICAL THERAPY | Facility: HOSPITAL | Age: 26
End: 2021-03-23
Attending: Other
Payer: COMMERCIAL

## 2021-03-23 DIAGNOSIS — M54.12 CERVICAL RADICULOPATHY: ICD-10-CM

## 2021-03-23 PROCEDURE — 97112 NEUROMUSCULAR REEDUCATION: CPT

## 2021-03-23 PROCEDURE — 97161 PT EVAL LOW COMPLEX 20 MIN: CPT

## 2021-03-24 NOTE — PROGRESS NOTES
SPINE EVALUATION:   Referring Physician: Dr. Teresa Richey  Diagnosis: Cervical radiculopathy (M54.12)     Date of Service: 3/23/2021     PATIENT SUMMARY   Quinton Edward is a R handed 30year old female who presents to therapy today with complaints of lef holding head down to read/phone  Neutral: aleve, tylenol   Alleviating: heat packs, hot shower, lay flat or sit fully upright    Previously employed as a , hasn't done that since 2018.  Lately has been working more as . High desk (chest Observation/Posture: upper crossed posture, forward head.  Resting posture over time demonstrates hyperkyphosis of upper thoracic spine and CTJ flexion but when correcting posture if cued, demonstrates decreased thoracic kyphosis  Neuro Screen: tingling i modifications, possible soreness after evaluation, modalities as needed [ice/heat] and postural corrections  Patient was instructed in and issued a HEP for: DNF recruitment with towel roll     MT - long axis traction, ppivm rotation to L   NMR - DNF chin t course of care. Thank you for your referral. Please co-sign or sign and return this letter via fax as soon as possible to [phone].  If you have any questions, please contact me at Dept: [phone]    Sincerely,  Electronically signed by therapist:

## 2021-03-25 ENCOUNTER — APPOINTMENT (OUTPATIENT)
Dept: PHYSICAL THERAPY | Facility: HOSPITAL | Age: 26
End: 2021-03-25
Attending: Other
Payer: COMMERCIAL

## 2021-03-29 ENCOUNTER — APPOINTMENT (OUTPATIENT)
Dept: PHYSICAL THERAPY | Facility: HOSPITAL | Age: 26
End: 2021-03-29
Attending: Other
Payer: COMMERCIAL

## 2021-03-30 ENCOUNTER — OFFICE VISIT (OUTPATIENT)
Dept: PHYSICAL THERAPY | Facility: HOSPITAL | Age: 26
End: 2021-03-30
Attending: Other
Payer: COMMERCIAL

## 2021-03-30 DIAGNOSIS — M54.12 CERVICAL RADICULOPATHY: ICD-10-CM

## 2021-03-30 PROCEDURE — 97140 MANUAL THERAPY 1/> REGIONS: CPT

## 2021-03-30 PROCEDURE — 97110 THERAPEUTIC EXERCISES: CPT

## 2021-03-31 NOTE — PROGRESS NOTES
Dx: Cervical radiculopathy (M54.12)             Insurance (Authorized # of Visits):  [facility] 2/12           Authorizing Physician: Dr. Carrie Barrera MD visit: none scheduled  Fall Risk: standard         Precautions: n/a             Subjective: Patient stat with overhead reaching   · Pt will improve postural strength (mid/low trap) to 4+/5 to promote improved upright posturing and decreased pain with carrying objects   · Pt will be independent and compliant with comprehensive HEP to maintain progress achieved

## 2021-04-05 ENCOUNTER — APPOINTMENT (OUTPATIENT)
Dept: PHYSICAL THERAPY | Facility: HOSPITAL | Age: 26
End: 2021-04-05
Attending: Other
Payer: COMMERCIAL

## 2021-04-06 ENCOUNTER — OFFICE VISIT (OUTPATIENT)
Dept: PHYSICAL THERAPY | Facility: HOSPITAL | Age: 26
End: 2021-04-06
Attending: Other
Payer: COMMERCIAL

## 2021-04-06 DIAGNOSIS — M54.12 CERVICAL RADICULOPATHY: ICD-10-CM

## 2021-04-06 PROCEDURE — 97140 MANUAL THERAPY 1/> REGIONS: CPT

## 2021-04-06 PROCEDURE — 97110 THERAPEUTIC EXERCISES: CPT

## 2021-04-06 NOTE — PROGRESS NOTES
Dx: Cervical radiculopathy (M54.12)             Insurance (Authorized # of Visits):  [facility] 3/12           Authorizing Physician: Dr. Diana Carrizales  Next MD visit: none scheduled  Fall Risk: standard         Precautions: n/a             Subjective:   Symptoms a degrees to improve tolerance for looking down to tie shoes   · Pt will improve cervical AROM extension to painless 60 degrees to improve tolerance for putting dishes into overhead cabinets   · Pt will improve cervical AROM rotation to >65 degrees to improv with cuing for motor control   x10      HEP: DNF, open book rotation, horiz abd/scap retract, radial nerve glide; scalene stretches, cervical rotation    Charges: MT - 1, TE - 2       Total Timed Treatment: 45 min  Total Treatment Time: 45 min

## 2021-04-07 ENCOUNTER — APPOINTMENT (OUTPATIENT)
Dept: PHYSICAL THERAPY | Facility: HOSPITAL | Age: 26
End: 2021-04-07
Attending: Other
Payer: COMMERCIAL

## 2021-04-08 ENCOUNTER — OFFICE VISIT (OUTPATIENT)
Dept: PHYSICAL THERAPY | Facility: HOSPITAL | Age: 26
End: 2021-04-08
Attending: Other
Payer: COMMERCIAL

## 2021-04-08 DIAGNOSIS — M54.12 CERVICAL RADICULOPATHY: ICD-10-CM

## 2021-04-08 PROCEDURE — 97110 THERAPEUTIC EXERCISES: CPT

## 2021-04-08 PROCEDURE — 97140 MANUAL THERAPY 1/> REGIONS: CPT

## 2021-04-08 NOTE — PROGRESS NOTES
Dx: Cervical radiculopathy (M54.12)             Insurance (Authorized # of Visits):  [facility] 3/12           Authorizing Physician: Dr. Rebel Arrington  Next MD visit: none scheduled  Fall Risk: standard         Precautions: n/a             Subjective:  Tuesday nig will have improved thoracic PA mobility to WNL to improve cervical ROM as well as promote upright posturing and decreased pain with carrying   · Pt will demonstrate improved cervical intrinsic strength to 5/5 to allow improved cervical stabilization with o for motor control   x10 NMR:   ---     HEP: DNF, open book rotation, horiz abd/scap retract, radial nerve glide; scalene stretches, cervical rotation    Charges: MT - 1, TE - 2       Total Timed Treatment: 40 min  Total Treatment Time: 40 min

## 2021-04-12 ENCOUNTER — APPOINTMENT (OUTPATIENT)
Dept: PHYSICAL THERAPY | Facility: HOSPITAL | Age: 26
End: 2021-04-12
Attending: Other
Payer: COMMERCIAL

## 2021-04-13 ENCOUNTER — OFFICE VISIT (OUTPATIENT)
Dept: PHYSICAL THERAPY | Facility: HOSPITAL | Age: 26
End: 2021-04-13
Attending: Other
Payer: COMMERCIAL

## 2021-04-13 DIAGNOSIS — M54.12 CERVICAL RADICULOPATHY: ICD-10-CM

## 2021-04-13 PROCEDURE — 97140 MANUAL THERAPY 1/> REGIONS: CPT

## 2021-04-13 PROCEDURE — 97110 THERAPEUTIC EXERCISES: CPT

## 2021-04-13 NOTE — PROGRESS NOTES
Dx: Cervical radiculopathy (M54.12)             Insurance (Authorized # of Visits):  [facility] 5/12           Authorizing Physician: Dr. Kaylie iVvas  Next MD visit: none scheduled  Fall Risk: standard         Precautions: n/a             Subjective:   Today pt fe strength to 5/5 to allow improved cervical stabilization with overhead reaching   · Pt will improve postural strength (mid/low trap) to 4+/5 to promote improved upright posturing and decreased pain with carrying objects   · Pt will be independent and compl Supine band horizontal abduction YTB 2x10  Supine band diagonal flexion YTB x10 bilateral          NMR:   Cervical rotation at mirror with cuing for motor control   x10 NMR:   ---     HEP: DNF, open book rotation, horiz abd/scap retract, radial nerve gli

## 2021-04-14 ENCOUNTER — APPOINTMENT (OUTPATIENT)
Dept: PHYSICAL THERAPY | Facility: HOSPITAL | Age: 26
End: 2021-04-14
Attending: Other
Payer: COMMERCIAL

## 2021-04-15 ENCOUNTER — OFFICE VISIT (OUTPATIENT)
Dept: PHYSICAL THERAPY | Facility: HOSPITAL | Age: 26
End: 2021-04-15
Attending: Other
Payer: COMMERCIAL

## 2021-04-15 DIAGNOSIS — M54.12 CERVICAL RADICULOPATHY: ICD-10-CM

## 2021-04-15 PROCEDURE — 97110 THERAPEUTIC EXERCISES: CPT

## 2021-04-15 PROCEDURE — 97140 MANUAL THERAPY 1/> REGIONS: CPT

## 2021-04-15 NOTE — PROGRESS NOTES
Dx: Cervical radiculopathy (M54.12)             Insurance (Authorized # of Visits):  [facility] 6/12           Authorizing Physician: Dr. Kp Murdock  Next MD visit: none scheduled  Fall Risk: standard         Precautions: n/a             Subjective:  Did try maxim · Pt will demonstrate improved cervical intrinsic strength to 5/5 to allow improved cervical stabilization with overhead reaching   · Pt will improve postural strength (mid/low trap) to 4+/5 to promote improved upright posturing and decreased pain with c abduction, YTB 2x10 w/ chin tuck   wall angels 2x10, cue cervical retraction    TE:   Assisted L rotation w/ towel roll, mulligan MWM; supine 2x10   Supine band horizontal abduction YTB 2x10  Supine band diagonal flexion YTB x10 bilateral      TE:   Passiv

## 2021-04-19 ENCOUNTER — APPOINTMENT (OUTPATIENT)
Dept: PHYSICAL THERAPY | Facility: HOSPITAL | Age: 26
End: 2021-04-19
Attending: Other
Payer: COMMERCIAL

## 2021-04-19 ENCOUNTER — TELEPHONE (OUTPATIENT)
Dept: PHYSICAL THERAPY | Facility: HOSPITAL | Age: 26
End: 2021-04-19

## 2021-04-20 ENCOUNTER — APPOINTMENT (OUTPATIENT)
Dept: PHYSICAL THERAPY | Facility: HOSPITAL | Age: 26
End: 2021-04-20
Attending: Other
Payer: COMMERCIAL

## 2021-04-20 ENCOUNTER — TELEPHONE (OUTPATIENT)
Dept: PHYSICAL THERAPY | Facility: HOSPITAL | Age: 26
End: 2021-04-20

## 2021-04-21 ENCOUNTER — APPOINTMENT (OUTPATIENT)
Dept: PHYSICAL THERAPY | Facility: HOSPITAL | Age: 26
End: 2021-04-21
Attending: Other
Payer: COMMERCIAL

## 2021-04-22 ENCOUNTER — OFFICE VISIT (OUTPATIENT)
Dept: PHYSICAL THERAPY | Facility: HOSPITAL | Age: 26
End: 2021-04-22
Attending: Other
Payer: COMMERCIAL

## 2021-04-22 DIAGNOSIS — M54.12 CERVICAL RADICULOPATHY: ICD-10-CM

## 2021-04-22 PROCEDURE — 97140 MANUAL THERAPY 1/> REGIONS: CPT

## 2021-04-22 PROCEDURE — 97110 THERAPEUTIC EXERCISES: CPT

## 2021-04-22 NOTE — PROGRESS NOTES
Dx: Cervical radiculopathy (M54.12)             Insurance (Authorized # of Visits):  [facility] 7/12           Authorizing Physician: Dr. Pia Apley  Next MD visit: none scheduled  Fall Risk: standard         Precautions: n/a             Subjective:  Patient sta turning head to check blind spot while driving  · Pt will have improved thoracic PA mobility to WNL to improve cervical ROM as well as promote upright posturing and decreased pain with carrying   · Pt will demonstrate improved cervical intrinsic strength t min     TherEx:   Radial nerve glide x15  Open book rotation x10 (L)   Scaplar retraction (horiz abd) x15  TE:   Scalene stretching 4x15s  dnf chin tucks x10, 5s holds  MET and isometric cervical rotation 3x5   TE:   Prone I, T, Y 2x10 bilateral ; tactile

## 2021-04-27 ENCOUNTER — TELEPHONE (OUTPATIENT)
Dept: PHYSICAL THERAPY | Age: 26
End: 2021-04-27

## 2021-04-27 ENCOUNTER — APPOINTMENT (OUTPATIENT)
Dept: PHYSICAL THERAPY | Facility: HOSPITAL | Age: 26
End: 2021-04-27
Attending: Other
Payer: COMMERCIAL

## 2021-04-29 ENCOUNTER — OFFICE VISIT (OUTPATIENT)
Dept: PHYSICAL THERAPY | Facility: HOSPITAL | Age: 26
End: 2021-04-29
Attending: Other
Payer: COMMERCIAL

## 2021-04-29 DIAGNOSIS — M54.12 CERVICAL RADICULOPATHY: ICD-10-CM

## 2021-04-29 PROCEDURE — 97110 THERAPEUTIC EXERCISES: CPT

## 2021-04-29 NOTE — PROGRESS NOTES
Dx: Cervical radiculopathy (M54.12)             Insurance (Authorized # of Visits):  [facility] P[employer] 8/12           Authorizing Physician: Dr. Yolanda Felipe  Next MD visit: none scheduled  Fall Risk: standard         Precautions: n/a             Subjective:  Forearm tin degrees to improve tolerance for putting dishes into overhead cabinets   · Pt will improve cervical AROM rotation to >65 degrees to improve tolerance for turning head to check blind spot while driving  · Pt will have improved thoracic PA mobility to WNL to supine 2x10   Supine band horizontal abduction YTB 2x10  Supine band diagonal flexion YTB x10 bilateral      TE:   Passive upper trap stretching 2x30s  DNF head lifts (assisted) 3x10s holds  Chin tuck at wall w/ towel 2x10   Wall posture drill, cuing to ce

## 2021-05-05 ENCOUNTER — TELEPHONE (OUTPATIENT)
Dept: PHYSICAL THERAPY | Facility: HOSPITAL | Age: 26
End: 2021-05-05

## 2021-05-05 NOTE — TELEPHONE ENCOUNTER
Left voicemail offering PT opening for tomorrow evening at 5:30, also informed patient of appointment time being held for next Tues 5/11 at 7:00 PM. Informed pt that opening for tomorrow is first-come first-served basis and to call back asap to claim openi

## 2021-05-11 ENCOUNTER — OFFICE VISIT (OUTPATIENT)
Dept: PHYSICAL THERAPY | Facility: HOSPITAL | Age: 26
End: 2021-05-11
Attending: Other
Payer: COMMERCIAL

## 2021-05-11 PROCEDURE — 97140 MANUAL THERAPY 1/> REGIONS: CPT

## 2021-05-11 PROCEDURE — 97110 THERAPEUTIC EXERCISES: CPT

## 2021-05-12 NOTE — PROGRESS NOTES
Dx: Cervical radiculopathy (M54.12)             Insurance (Authorized # of Visits):  [place] Linder O 9/12           Authorizing Physician: Dr. Julien ref.  provider found  Next MD visit: none scheduled  Fall Risk: standard         Precautions: n/a             Subjecti with carrying objects   · Pt will be independent and compliant with comprehensive HEP to maintain progress achieved in PT     Plan: upright cervical retraction and postural awareness; scapular training and postural training; thoracic mobility  Patient will forward self resisted cervical rotation 2x10   Unloading cervical rotation 2x10   R body turns on rolling stool 3x10, stationary/stabilized head  Band rows RTB 2x10  TE:   Chin tuck with alternating shoulder flex 2x10   Shoulder rotation (closed chain cerv

## 2021-05-25 ENCOUNTER — OFFICE VISIT (OUTPATIENT)
Dept: PHYSICAL THERAPY | Facility: HOSPITAL | Age: 26
End: 2021-05-25
Attending: Other
Payer: COMMERCIAL

## 2021-05-25 PROCEDURE — 97110 THERAPEUTIC EXERCISES: CPT

## 2021-05-25 PROCEDURE — 97140 MANUAL THERAPY 1/> REGIONS: CPT

## 2021-05-25 NOTE — PROGRESS NOTES
Dx: Cervical radiculopathy (M54.12)             Insurance (Authorized # of Visits):  [place] [address] PPO 10/12           Authorizing Physician: Dr. Julien ref.  provider found  Next MD visit: none scheduled  Fall Risk: standard         Precautions: n/a             Subject carrying   · Pt will demonstrate improved cervical intrinsic strength to 5/5 to allow improved cervical stabilization with overhead reaching   · Pt will improve postural strength (mid/low trap) to 4+/5 to promote improved upright posturing and decreased pa x10 bilateral      TE:   Passive upper trap stretching 2x30s  DNF head lifts (assisted) 3x10s holds  Chin tuck at wall w/ towel 2x10   Wall posture drill, cuing to cervical retraction  Ball on wall flexion/retraction x15   TE:   Mulligan towel rotation 2x1

## 2021-06-10 ENCOUNTER — OFFICE VISIT (OUTPATIENT)
Dept: PHYSICAL THERAPY | Facility: HOSPITAL | Age: 26
End: 2021-06-10
Attending: Other
Payer: COMMERCIAL

## 2021-06-10 PROCEDURE — 97110 THERAPEUTIC EXERCISES: CPT

## 2021-06-10 NOTE — PROGRESS NOTES
[place]  Pt has attended 11 visits in Physical Therapy. Dx: Cervical radiculopathy (M54.12)             Insurance (Authorized # of Visits):  Sanam Severino [facility] 11/12           Authorizing Physician: Dr. Julien ref.  provider found  Next MD visit: none sche 12s      Assessment: At this time, patient's localized neck pain has remained stable and she has had no return of radiating arm pain or severe flare ups.  She has maintained consistency with her home exercise program. She notes no functional limitations asi C/S, directional variation to minimize symptom reproduction gr II  Levator release   16 min   MT:   ---     TE:   Passive upper trap stretching 2x30s  DNF head lifts (assisted) 3x10s holds  Chin tuck at wall w/ towel 2x10   Wall posture drill, cuing to cer [phone]. I certify the need for these services furnished under this plan of treatment and while under my care.     X___________________________________________________ Date____________________    Certification From: 0/40/8728  To:9/13/2021

## 2021-06-17 ENCOUNTER — APPOINTMENT (OUTPATIENT)
Dept: PHYSICAL THERAPY | Facility: HOSPITAL | Age: 26
End: 2021-06-17
Attending: Other
Payer: COMMERCIAL

## 2021-06-18 ENCOUNTER — OFFICE VISIT (OUTPATIENT)
Dept: FAMILY MEDICINE CLINIC | Facility: CLINIC | Age: 26
End: 2021-06-18
Payer: COMMERCIAL

## 2021-06-18 ENCOUNTER — TELEPHONE (OUTPATIENT)
Dept: INTERNAL MEDICINE CLINIC | Facility: CLINIC | Age: 26
End: 2021-06-18

## 2021-06-18 VITALS
SYSTOLIC BLOOD PRESSURE: 122 MMHG | HEART RATE: 90 BPM | RESPIRATION RATE: 12 BRPM | BODY MASS INDEX: 23.19 KG/M2 | OXYGEN SATURATION: 98 % | WEIGHT: 126 LBS | TEMPERATURE: 99 F | HEIGHT: 62 IN | DIASTOLIC BLOOD PRESSURE: 82 MMHG

## 2021-06-18 DIAGNOSIS — R59.9 ENLARGEMENT OF LYMPH NODE: Primary | ICD-10-CM
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
## 2021-06-18 PROCEDURE — 3008F BODY MASS INDEX DOCD: CPT | Performed by: PHYSICIAN ASSISTANT

## 2021-06-18 PROCEDURE — 99212 OFFICE O/P EST SF 10 MIN: CPT | Performed by: PHYSICIAN ASSISTANT

## 2021-06-18 PROCEDURE — 3074F SYST BP LT 130 MM HG: CPT | Performed by: PHYSICIAN ASSISTANT

## 2021-06-18 PROCEDURE — 3079F DIAST BP 80-89 MM HG: CPT | Performed by: PHYSICIAN ASSISTANT

## 2021-06-18 NOTE — TELEPHONE ENCOUNTER
Patient had rashes on her neck,very itchy and getting worst.No SOB. Advised to go to Immediate care center . Patient affirmed understanding and agrees with plan.  All questions were answered

## 2021-06-18 NOTE — PROGRESS NOTES
CHIEF COMPLAINT:   Patient presents with:  Bite Sting,Insect: right side, noticed bug bites Wednesday night, swollen bump in neck today      HPI:     Francisco J Bella is a 27year old female who presents with concerns of insect bite to right lateral ne of the skin or numbness.     EXAM:   /82   Pulse 90   Temp 98.8 °F (37.1 °C)   Resp 12   Ht 5' 2\" (1.575 m)   Wt 126 lb (57.2 kg)   LMP 05/26/2021 (Approximate)   SpO2 98%   Breastfeeding No   BMI 23.05 kg/m²   GENERAL: well developed, well nourished So watch for the signs below. Sometimes it is hard to tell the difference between a local reaction to the insect bite or sting and an early infection. Your healthcare provider may give you antibiotics.   Common stinging insects that cause reactions are wasp on your skin. In some people, it can cause more of a localized skin rash due to allergy to the cream.  · Calamine lotion or oatmeal baths sometimes help with itching.   · You may use acetaminophen or ibuprofen to control pain, unless another pain medicine w develop any of the warning signs below, seek help right away. · If your reaction includes dizziness, fainting, or trouble breathing or swallowing, ask your healthcare provider if you need epinephrine auto-injectors.     Follow-up care  Follow up with your cells (lymphocytes) that help the body fight infection and disease.   Why lymph nodes swell  Lymphadenopathy is very common. The glands often get larger during a viral or bacterial infection. It can happen during a cold, the flu, or strep throat.  The nodes she will ask how long they have been swollen and if they are painful. You may be advised to have diagnostic tests and referral to specialists may be advised. The tests may include:  · Blood tests.  These are done to check for signs of infection and other pr

## 2021-06-19 NOTE — PATIENT INSTRUCTIONS
Local Reaction to an Insect Sting    You have been stung or bitten by an insect. The insect’s venom or body fluid is causing your skin to react in the area where you were stung or bitten. This often causes redness, itching, and swelling.  This reaction wi trouble urinating because of an enlarged prostate. Other antihistamines may cause less drowsiness. They may be better choices for daytime use. Ask your pharmacist for suggestions.   · If you have large areas of localized swelling, you may be prescribed oral in your skin. Wasps, yellow jackets, and hornets don’t leave a stinger behind. Move away from the nest area right away. The stinger of a honeybee releases a substance that will attract other bees to you.  Once you are away from the nest, remove the stinger Lymphadenopathy is swelling of the lymph nodes. Lymph nodes are small, bean-shaped glands around the body. What are lymph nodes? Lymph nodes are part of your immune system.  These glands are found in your neck, over your clavicle, armpits, groin, chest, a infection causing the swollen glands. These symptoms may include fever, sore throat, body aches, or cough. Diagnosing lymphadenopathy  Your healthcare provider will ask about your health history and symptoms.  He or she will give you a physical exam and ch content on 6/1/2019  © 1900-5472 [employer]. All rights reserved. This information is not intended as a substitute for professional medical care. Always follow your healthcare professional's instructions.

## 2021-07-16 ENCOUNTER — OFFICE VISIT (OUTPATIENT)
Dept: FAMILY MEDICINE CLINIC | Facility: CLINIC | Age: 26
End: 2021-07-16
Payer: COMMERCIAL

## 2021-07-16 VITALS
HEART RATE: 96 BPM | SYSTOLIC BLOOD PRESSURE: 112 MMHG | RESPIRATION RATE: 12 BRPM | HEIGHT: 62 IN | DIASTOLIC BLOOD PRESSURE: 70 MMHG | OXYGEN SATURATION: 99 % | BODY MASS INDEX: 23.19 KG/M2 | TEMPERATURE: 98 F | WEIGHT: 126 LBS

## 2021-07-16 DIAGNOSIS — R30.0 DYSURIA: Primary | ICD-10-CM

## 2021-07-16 LAB
APPEARANCE: CLEAR
BILIRUBIN: NEGATIVE
GLUCOSE (URINE DIPSTICK): NEGATIVE MG/DL
KETONES (URINE DIPSTICK): NEGATIVE MG/DL
LEUKOCYTES: NEGATIVE
MULTISTIX LOT#: 5077 NUMERIC
NITRITE, URINE: NEGATIVE
PH, URINE: 6.5 (ref 4.5–8)
PROTEIN (URINE DIPSTICK): NEGATIVE MG/DL
SPECIFIC GRAVITY: 1.03 (ref 1–1.03)
URINE-COLOR: YELLOW
UROBILINOGEN,SEMI-QN: 0.2 MG/DL (ref 0–1.9)

## 2021-07-16 PROCEDURE — 81003 URINALYSIS AUTO W/O SCOPE: CPT | Performed by: NURSE PRACTITIONER

## 2021-07-16 PROCEDURE — 87086 URINE CULTURE/COLONY COUNT: CPT | Performed by: NURSE PRACTITIONER

## 2021-07-16 PROCEDURE — 3074F SYST BP LT 130 MM HG: CPT | Performed by: NURSE PRACTITIONER

## 2021-07-16 PROCEDURE — 3078F DIAST BP <80 MM HG: CPT | Performed by: NURSE PRACTITIONER

## 2021-07-16 PROCEDURE — 3008F BODY MASS INDEX DOCD: CPT | Performed by: NURSE PRACTITIONER

## 2021-07-16 PROCEDURE — 99212 OFFICE O/P EST SF 10 MIN: CPT | Performed by: NURSE PRACTITIONER

## 2021-07-16 NOTE — PATIENT INSTRUCTIONS
If a urine culture was sent out, we will contact you with the results in 48-72 hours via phone or [employer]hart. If positive, then we will call in an appropriate antibiotic or change antibiotic if needed.  If negative, then you should stop antibiotics as it i discharge or other symptoms. Dysuria with Uncertain Cause (Adult)    The urethra is the tube that allows urine to pass out of the body. In a woman, the urethra is the opening above the vagina.  In men, the urethra is the opening on the tip of the p provider or go to an urgent care clinic or the public health department to be looked at and treated. · Don't have sex until both you and your partner have finished all antibiotics and your healthcare provider says you are no longer contagious.   · Learn ab

## 2021-07-16 NOTE — PROGRESS NOTES
CHIEF COMPLAINT:   Patient presents with:  UTI: Frequent urination followed by burning when urinating , right side pains in backside - Entered by patient        HPI:   Summer Xavier is a 27year old female presents with symptoms of UTI.  Complaining Temp 97.7 °F (36.5 °C) (Tympanic)   Resp 12   Ht 5' 2\" (1.575 m)   Wt 126 lb (57.2 kg)   LMP 07/10/2021 (Exact Date)   SpO2 99%   BMI 23.05 kg/m²   GENERAL: well developed, well nourished,in no apparent distress  CARDIO: RRR, no murmurs  LUNGS: clear to a a urine culture was sent out, we will contact you with the results in 48-72 hours via phone or righTunehart. If positive, then we will call in an appropriate antibiotic or change antibiotic if needed.  If negative, then you should stop antibiotics as it is not i discharge or other symptoms. Dysuria with Uncertain Cause (Adult)    The urethra is the tube that allows urine to pass out of the body. In a woman, the urethra is the opening above the vagina.  In men, the urethra is the opening on the tip of the p provider or go to an urgent care clinic or the public health department to be looked at and treated. · Don't have sex until both you and your partner have finished all antibiotics and your healthcare provider says you are no longer contagious.   · Learn ab

## 2021-08-09 ENCOUNTER — OFFICE VISIT (OUTPATIENT)
Dept: INTERNAL MEDICINE CLINIC | Facility: CLINIC | Age: 26
End: 2021-08-09
Payer: COMMERCIAL

## 2021-08-09 VITALS
OXYGEN SATURATION: 99 % | WEIGHT: 126 LBS | HEART RATE: 85 BPM | HEIGHT: 62 IN | SYSTOLIC BLOOD PRESSURE: 116 MMHG | BODY MASS INDEX: 23.19 KG/M2 | DIASTOLIC BLOOD PRESSURE: 72 MMHG

## 2021-08-09 DIAGNOSIS — Z00.00 ANNUAL PHYSICAL EXAM: Primary | ICD-10-CM

## 2021-08-09 DIAGNOSIS — I34.0 MITRAL VALVE INSUFFICIENCY, UNSPECIFIED ETIOLOGY: ICD-10-CM

## 2021-08-09 DIAGNOSIS — R11.0 NAUSEA: ICD-10-CM

## 2021-08-09 DIAGNOSIS — R00.2 PALPITATIONS: ICD-10-CM

## 2021-08-09 DIAGNOSIS — J45.20 MILD INTERMITTENT ASTHMA WITHOUT COMPLICATION: ICD-10-CM

## 2021-08-09 DIAGNOSIS — R50.9 FEVER, UNSPECIFIED FEVER CAUSE: ICD-10-CM

## 2021-08-09 DIAGNOSIS — Z86.16 HISTORY OF COVID-19: ICD-10-CM

## 2021-08-09 LAB
BILIRUB UR QL: NEGATIVE
COLOR UR: YELLOW
GLUCOSE UR-MCNC: NEGATIVE MG/DL
HGB UR QL STRIP.AUTO: NEGATIVE
KETONES UR-MCNC: NEGATIVE MG/DL
LEUKOCYTE ESTERASE UR QL STRIP.AUTO: NEGATIVE
NITRITE UR QL STRIP.AUTO: NEGATIVE
PH UR: 7 [PH] (ref 5–8)
PROT UR-MCNC: NEGATIVE MG/DL
SP GR UR STRIP: 1.02 (ref 1–1.03)
UROBILINOGEN UR STRIP-ACNC: <2

## 2021-08-09 PROCEDURE — 3008F BODY MASS INDEX DOCD: CPT | Performed by: INTERNAL MEDICINE

## 2021-08-09 PROCEDURE — 99395 PREV VISIT EST AGE 18-39: CPT | Performed by: INTERNAL MEDICINE

## 2021-08-09 PROCEDURE — 3078F DIAST BP <80 MM HG: CPT | Performed by: INTERNAL MEDICINE

## 2021-08-09 PROCEDURE — 3074F SYST BP LT 130 MM HG: CPT | Performed by: INTERNAL MEDICINE

## 2021-08-09 PROCEDURE — 81001 URINALYSIS AUTO W/SCOPE: CPT | Performed by: INTERNAL MEDICINE

## 2021-08-09 RX ORDER — FAMOTIDINE 40 MG/1
40 TABLET, FILM COATED ORAL DAILY
Qty: 90 TABLET | Refills: 0 | Status: SHIPPED | OUTPATIENT
Start: 2021-08-09 | End: 2021-11-07

## 2021-08-09 RX ORDER — ONDANSETRON 4 MG/1
4 TABLET, ORALLY DISINTEGRATING ORAL EVERY 8 HOURS PRN
Qty: 30 TABLET | Refills: 0 | Status: SHIPPED | OUTPATIENT
Start: 2021-08-09

## 2021-08-09 RX ORDER — RIBOFLAVIN (VITAMIN B2) 100 MG
100 TABLET ORAL DAILY
COMMUNITY

## 2021-08-09 NOTE — PATIENT INSTRUCTIONS
What Is Irritable Bowel Syndrome (IBS)? People who have irritable bowel syndrome (IBS)  have digestive tracts that react abnormally to certain substances or to stress. This leads to symptoms like cramps, gas, bloating, pain, constipation, and diarrhea. may help manage the symptoms. It may help your digestive tract work better. Your healthcare provider may prescribe one or more medicines for you.  Because some medicines may make IBS worse, don’t take any medicine, especially laxatives, unless your healthca 6 to 8 glasses of water a day. · Don't have caffeine or tobacco. These are muscle stimulants and can affect the working of your digestive tract. · Don't drink alcohol. It can irritate your digestive tract and make your symptoms worse.   · Eat more fiber i

## 2021-08-09 NOTE — PROGRESS NOTES
Summer Xavier is a 27year old female.     Chief complaint:  Annual physical exam       HPI:     Summer Xavier is a 27year old pleasant female who presents for annual physical exam      IBS and GERD  Doesn't take anything for that   Has an ap intermittent asthma without complication     Cervical radiculopathy     Gastroesophageal reflux disease     Abnormal sensation of leg      REVIEW OF SYSTEMS:   A comprehensive 10 point review of systems was completed.   Pertinent positives and negatives not (VITAMIN C) 100 MG Oral Tab; Take 100 mg by mouth daily.  - CBC WITH DIFFERENTIAL WITH PLATELET; Future  - COMP METABOLIC PANEL (14); Future  - LIPID PANEL; Future  - TSH W REFLEX TO FREE T4; Future  - famoTIDine 40 MG Oral Tab;  Take 1 tablet (40 mg total) URINALYSIS WITH CULTURE REFLEX    5. Fever, unspecified fever cause  CXR and ua   Cbc   - Ascorbic Acid (VITAMIN C) 100 MG Oral Tab; Take 100 mg by mouth daily.  - CBC WITH DIFFERENTIAL WITH PLATELET; Future  - COMP METABOLIC PANEL (14);  Future  - LIPID PA

## 2021-08-10 ENCOUNTER — HOSPITAL ENCOUNTER (OUTPATIENT)
Dept: GENERAL RADIOLOGY | Facility: HOSPITAL | Age: 26
Discharge: HOME | End: 2021-08-10
Attending: INTERNAL MEDICINE
Payer: COMMERCIAL

## 2021-08-10 ENCOUNTER — LAB ENCOUNTER (OUTPATIENT)
Dept: LAB | Facility: REFERENCE LAB | Age: 26
End: 2021-08-10
Attending: INTERNAL MEDICINE
Payer: COMMERCIAL

## 2021-08-10 DIAGNOSIS — R11.0 NAUSEA: ICD-10-CM

## 2021-08-10 DIAGNOSIS — R00.2 PALPITATIONS: ICD-10-CM

## 2021-08-10 DIAGNOSIS — Z86.16 HISTORY OF COVID-19: ICD-10-CM

## 2021-08-10 DIAGNOSIS — J45.20 MILD INTERMITTENT ASTHMA WITHOUT COMPLICATION: ICD-10-CM

## 2021-08-10 DIAGNOSIS — R50.9 FEVER, UNSPECIFIED FEVER CAUSE: ICD-10-CM

## 2021-08-10 DIAGNOSIS — I34.0 MITRAL VALVE INSUFFICIENCY, UNSPECIFIED ETIOLOGY: ICD-10-CM

## 2021-08-10 DIAGNOSIS — Z00.00 ANNUAL PHYSICAL EXAM: ICD-10-CM

## 2021-08-10 LAB
ALBUMIN SERPL-MCNC: 3.7 G/DL (ref 3.4–5)
ALBUMIN/GLOB SERPL: 1.1 {RATIO} (ref 1–2)
ALP LIVER SERPL-CCNC: 46 U/L
ALT SERPL-CCNC: 22 U/L
ANION GAP SERPL CALC-SCNC: 10 MMOL/L (ref 0–18)
AST SERPL-CCNC: 12 U/L (ref 15–37)
BASOPHILS # BLD AUTO: 0.05 X10(3) UL (ref 0–0.2)
BASOPHILS NFR BLD AUTO: 0.6 %
BILIRUB SERPL-MCNC: 0.8 MG/DL (ref 0.1–2)
BUN BLD-MCNC: 10 MG/DL (ref 7–18)
BUN/CREAT SERPL: 16.9 (ref 10–20)
CALCIUM BLD-MCNC: 8.8 MG/DL (ref 8.5–10.1)
CHLORIDE SERPL-SCNC: 108 MMOL/L (ref 98–112)
CHOLEST SMN-MCNC: 137 MG/DL (ref ?–200)
CO2 SERPL-SCNC: 22 MMOL/L (ref 21–32)
CREAT BLD-MCNC: 0.59 MG/DL
DEPRECATED RDW RBC AUTO: 45.2 FL (ref 35.1–46.3)
EOSINOPHIL # BLD AUTO: 0.07 X10(3) UL (ref 0–0.7)
EOSINOPHIL NFR BLD AUTO: 0.9 %
ERYTHROCYTE [DISTWIDTH] IN BLOOD BY AUTOMATED COUNT: 13.9 % (ref 11–15)
GLOBULIN PLAS-MCNC: 3.5 G/DL (ref 2.8–4.4)
GLUCOSE BLD-MCNC: 77 MG/DL (ref 70–99)
HCT VFR BLD AUTO: 41.6 %
HDLC SERPL-MCNC: 79 MG/DL (ref 40–59)
HGB BLD-MCNC: 13.4 G/DL
IMM GRANULOCYTES # BLD AUTO: 0.06 X10(3) UL (ref 0–1)
IMM GRANULOCYTES NFR BLD: 0.8 %
LDLC SERPL CALC-MCNC: 47 MG/DL (ref ?–100)
LYMPHOCYTES # BLD AUTO: 2.5 X10(3) UL (ref 1–4)
LYMPHOCYTES NFR BLD AUTO: 32.5 %
M PROTEIN MFR SERPL ELPH: 7.2 G/DL (ref 6.4–8.2)
MCH RBC QN AUTO: 28.9 PG (ref 26–34)
MCHC RBC AUTO-ENTMCNC: 32.2 G/DL (ref 31–37)
MCV RBC AUTO: 89.7 FL
MONOCYTES # BLD AUTO: 0.78 X10(3) UL (ref 0.1–1)
MONOCYTES NFR BLD AUTO: 10.1 %
NEUTROPHILS # BLD AUTO: 4.24 X10 (3) UL (ref 1.5–7.7)
NEUTROPHILS # BLD AUTO: 4.24 X10(3) UL (ref 1.5–7.7)
NEUTROPHILS NFR BLD AUTO: 55.1 %
NONHDLC SERPL-MCNC: 58 MG/DL (ref ?–130)
OSMOLALITY SERPL CALC.SUM OF ELEC: 288 MOSM/KG (ref 275–295)
PATIENT FASTING Y/N/NP: YES
PATIENT FASTING Y/N/NP: YES
PLATELET # BLD AUTO: 210 10(3)UL (ref 150–450)
POTASSIUM SERPL-SCNC: 3.2 MMOL/L (ref 3.5–5.1)
RBC # BLD AUTO: 4.64 X10(6)UL
SODIUM SERPL-SCNC: 140 MMOL/L (ref 136–145)
TRIGL SERPL-MCNC: 51 MG/DL (ref 30–149)
TSI SER-ACNC: 1.46 MIU/ML (ref 0.36–3.74)
VLDLC SERPL CALC-MCNC: 7 MG/DL (ref 0–30)
WBC # BLD AUTO: 7.7 X10(3) UL (ref 4–11)

## 2021-08-10 PROCEDURE — 71046 X-RAY EXAM CHEST 2 VIEWS: CPT | Performed by: INTERNAL MEDICINE

## 2021-08-10 PROCEDURE — 80061 LIPID PANEL: CPT

## 2021-08-10 PROCEDURE — 80053 COMPREHEN METABOLIC PANEL: CPT

## 2021-08-10 PROCEDURE — 85025 COMPLETE CBC W/AUTO DIFF WBC: CPT

## 2021-08-10 PROCEDURE — 84443 ASSAY THYROID STIM HORMONE: CPT

## 2021-08-10 PROCEDURE — 36415 COLL VENOUS BLD VENIPUNCTURE: CPT

## 2021-08-12 ENCOUNTER — OFFICE VISIT (OUTPATIENT)
Dept: GASTROENTEROLOGY | Facility: CLINIC | Age: 26
End: 2021-08-12
Payer: COMMERCIAL

## 2021-08-12 VITALS
TEMPERATURE: 97 F | HEART RATE: 88 BPM | DIASTOLIC BLOOD PRESSURE: 79 MMHG | WEIGHT: 123.81 LBS | SYSTOLIC BLOOD PRESSURE: 120 MMHG | BODY MASS INDEX: 20.63 KG/M2 | HEIGHT: 65 IN

## 2021-08-12 DIAGNOSIS — K58.2 IRRITABLE BOWEL SYNDROME WITH BOTH CONSTIPATION AND DIARRHEA: ICD-10-CM

## 2021-08-12 DIAGNOSIS — K21.9 GASTROESOPHAGEAL REFLUX DISEASE, UNSPECIFIED WHETHER ESOPHAGITIS PRESENT: Primary | ICD-10-CM

## 2021-08-12 PROCEDURE — 3078F DIAST BP <80 MM HG: CPT | Performed by: INTERNAL MEDICINE

## 2021-08-12 PROCEDURE — 3008F BODY MASS INDEX DOCD: CPT | Performed by: INTERNAL MEDICINE

## 2021-08-12 PROCEDURE — 99203 OFFICE O/P NEW LOW 30 MIN: CPT | Performed by: INTERNAL MEDICINE

## 2021-08-12 PROCEDURE — 3074F SYST BP LT 130 MM HG: CPT | Performed by: INTERNAL MEDICINE

## 2021-08-12 RX ORDER — POTASSIUM CHLORIDE 1500 MG/1
20 TABLET, FILM COATED, EXTENDED RELEASE ORAL DAILY
Qty: 3 TABLET | Refills: 0 | Status: SHIPPED | OUTPATIENT
Start: 2021-08-12 | End: 2021-08-15

## 2021-08-12 RX ORDER — PANTOPRAZOLE SODIUM 40 MG/1
40 TABLET, DELAYED RELEASE ORAL
Qty: 30 TABLET | Refills: 1 | Status: SHIPPED | OUTPATIENT
Start: 2021-08-12

## 2021-08-12 NOTE — PROGRESS NOTES
HPI/Subjective:   Patient ID: Shivam Rodriguez is a 27year old female. HPI  Mauricio Patino was initially referred by Dr. Leena Arrington in 2019. She most recently saw Dr. Alivia Stanton. Mauricio Patino states that she has had digestive symptoms dating back to age 5-10.   She w grandmother have gastroesophageal reflux    History/Other:   Review of Systems   See above    Wt Readings from Last 6 Encounters:  08/12/21 : 123 lb 12.8 oz (56.2 kg)  08/09/21 : 126 lb (57.2 kg)  07/16/21 : 126 lb (57.2 kg)  06/18/21 : 126 lb (57.2 kg)  0 normal. No respiratory distress. Breath sounds: Normal breath sounds. No wheezing or rales. Abdominal:      General: Bowel sounds are normal. There is no distension. Palpations: Abdomen is soft. There is no mass. Tenderness:  There is no ab 20.0 16.9     CALCIUM      8.5 - 10.1 mg/dL 8.8     CALCULATED OSMOLALITY      275 - 295 mOsm/kg 288     eGFR NON-AFR.  AMERICAN      >=60 125     eGFR       >=60 144     ALT (SGPT)      13 - 56 U/L 22     AST (SGOT)      15 - 37 U/L 12 (L) lung bases show the lung   bases to be clear.  The liver and spleen are normal in size. Images of the pancreatic region are unremarkable.  There are no   adrenal lesions.      Images obtained through the pelvis show no significant abnormal   pelvic fluid BASAL CELL HYPERPLASIA  -NO EOSINOPHILS IDENTIFIED  Comment: The villi in the duodenal biopsy are less than optimally oriented and  the villous height is difficult to accurately assess.  The villi that are  present have thin delicate architecture with no ev diagnosis.     SNOMED Code(s):  A:  [id number]  B:   [id number] F11354  C: [id number] F05972  [id number] E65802  D: G60625  [id number] [id number]          Assessment & Plan:   Gastroesophageal reflux disease, unspecified whether esophagitis p

## 2021-10-01 ENCOUNTER — OFFICE VISIT (OUTPATIENT)
Dept: NEUROLOGY | Facility: CLINIC | Age: 26
End: 2021-10-01
Payer: COMMERCIAL

## 2021-10-01 VITALS
SYSTOLIC BLOOD PRESSURE: 110 MMHG | HEART RATE: 70 BPM | WEIGHT: 125 LBS | HEIGHT: 62 IN | BODY MASS INDEX: 23 KG/M2 | DIASTOLIC BLOOD PRESSURE: 70 MMHG

## 2021-10-01 DIAGNOSIS — M54.12 CERVICAL RADICULOPATHY: Primary | ICD-10-CM

## 2021-10-01 PROCEDURE — 3078F DIAST BP <80 MM HG: CPT | Performed by: OTHER

## 2021-10-01 PROCEDURE — 3074F SYST BP LT 130 MM HG: CPT | Performed by: OTHER

## 2021-10-01 PROCEDURE — 99213 OFFICE O/P EST LOW 20 MIN: CPT | Performed by: OTHER

## 2021-10-01 PROCEDURE — 3008F BODY MASS INDEX DOCD: CPT | Performed by: OTHER

## 2021-10-01 RX ORDER — GABAPENTIN 100 MG/1
CAPSULE ORAL
Qty: 90 CAPSULE | Refills: 3 | Status: SHIPPED | OUTPATIENT
Start: 2021-10-01 | End: 2021-12-20

## 2021-10-01 NOTE — PROGRESS NOTES
She relates physical therapy brought significant improvement from the cervical spine left upper extremity pain. She had Covid in July. She stopped exercises at home. Recently recurrence of cervical spine and left C7 pain. No weakness.   No symptoms in l

## 2021-10-08 ENCOUNTER — OFFICE VISIT (OUTPATIENT)
Dept: FAMILY MEDICINE CLINIC | Facility: CLINIC | Age: 26
End: 2021-10-08
Payer: COMMERCIAL

## 2021-10-08 VITALS
DIASTOLIC BLOOD PRESSURE: 83 MMHG | RESPIRATION RATE: 16 BRPM | HEART RATE: 89 BPM | SYSTOLIC BLOOD PRESSURE: 124 MMHG | TEMPERATURE: 98 F | OXYGEN SATURATION: 100 % | HEIGHT: 62 IN | BODY MASS INDEX: 23 KG/M2 | WEIGHT: 125 LBS

## 2021-10-08 DIAGNOSIS — Z20.822 CLOSE EXPOSURE TO COVID-19 VIRUS: Primary | ICD-10-CM

## 2021-10-08 PROCEDURE — 3008F BODY MASS INDEX DOCD: CPT | Performed by: NURSE PRACTITIONER

## 2021-10-08 PROCEDURE — 99212 OFFICE O/P EST SF 10 MIN: CPT | Performed by: NURSE PRACTITIONER

## 2021-10-08 PROCEDURE — 3074F SYST BP LT 130 MM HG: CPT | Performed by: NURSE PRACTITIONER

## 2021-10-08 PROCEDURE — 3079F DIAST BP 80-89 MM HG: CPT | Performed by: NURSE PRACTITIONER

## 2021-10-08 NOTE — PROGRESS NOTES
CHIEF COMPLAINT:   Patient presents with:  Covid: Was exposed on October 3rd by an in home family member - Entered by patient      HPI:   Joan Delgadillo is a 27year old female who presents for Covid 19 exposure 4 days ago.   At this time, not experie Currently    Drug use: Never        REVIEW OF SYSTEMS:   GENERAL: feels well otherwise,   good appetite  SKIN: no rashes or abnormal skin lesions  HEENT: See HPI  LUNGS: denies shortness of breath, cough, or wheezing, See HPI  CARDIOVASCULAR: denies chest Visit:  Requested Prescriptions      No prescriptions requested or ordered in this encounter         Patient Instructions     Coronavirus Disease 2019 (COVID-19)     [facility] is committed to the safety and well-being of our patients, members, include stopping quarantine  • After 14 days from date of last exposure  • After 10 days without testing from date of last exposure  • After day 7 from date of last exposure with a negative test result (test must occur on day 5 or later)  After stopping qu tabletops, and doorknobs. Use household cleaning sprays or wipes according to the label instructions.          Seek Further Care     If you are awaiting test results or are confirmed positive for COVID -19, and your symptoms worsen at home with symptoms suc call within 2 business days, please call your primary care provider or check [employer] for results. Post-Discharge Follow-up  If you are diagnosed with COVID, refrain from exercise until approved by your primary care provider.  Please call your primary car 2019, [email or web address]. pdf  Centers for Disease Control & Prevention (CDC)  10 things you can do to manage your health at home, Amalia.nl. p people    References:  Long haulers: Why some people experience long-term coronavirus symptoms. (2021, February 08). Retrieved March 17, 2021, from [email or web address]/[email or web address]/covid-19-information/covid-19-long-andrea. html  Long-term effects of

## 2021-10-08 NOTE — PATIENT INSTRUCTIONS
Coronavirus Disease 2019 (COVID-19)     [facility] is committed to the safety and well-being of our patients, members, employees, and communities.  As concerns arise about the new strain of coronavirus that causes COVID-19, [facility] exposure  • After day 7 from date of last exposure with a negative test result (test must occur on day 5 or later)  After stopping quarantine, you should  • Watch for symptoms until 14 days after exposure.   • If you have symptoms, immediately self-isolate Care     If you are awaiting test results or are confirmed positive for COVID -19, and your symptoms worsen at home with symptoms such as: extreme weakness, difficult breathing, or unrelenting fevers greater than 100.4 degrees Fahrenheit, you should contac Follow-up  If you are diagnosed with COVID, refrain from exercise until approved by your primary care provider. Please call your primary care provider within 2 days of your discharge to arrange for a telehealth follow-up.  CDC does not recommend repeat test Control & Prevention (CDC)  10 things you can do to manage your health at home, Amalia.nl. pdf  [email or web address] Retrieved March 17, 2021, from [email or web address]/[email or web address]/covid-19-information/covid-19-long-haulers. html  Long-term effects of covid-19. (n.d.).  Retrieved May 11, 2021, from [employer]

## 2021-10-12 ENCOUNTER — TELEPHONE (OUTPATIENT)
Dept: NEUROLOGY | Facility: CLINIC | Age: 26
End: 2021-10-12

## 2021-10-12 NOTE — TELEPHONE ENCOUNTER
Pt wasn't feeling very well after taking gabapentin 100 MG Oral Cap for 5 days. Was wondering if there was another medication that he can prescribe instead. Or if she could go back to see her PT.  States that she was having relief from episodes in [facility]

## 2021-10-13 ENCOUNTER — TELEPHONE (OUTPATIENT)
Dept: NEUROLOGY | Facility: CLINIC | Age: 26
End: 2021-10-13

## 2021-10-13 DIAGNOSIS — M54.12 CERVICAL RADICULOPATHY: Primary | ICD-10-CM

## 2021-10-13 NOTE — TELEPHONE ENCOUNTER
Spoke to patient. 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   She states that right now she is managing with aleve b

## 2021-10-13 NOTE — TELEPHONE ENCOUNTER
Please tell her to discontinue the Neurontin. Taper by 1 pill/day. Tell her place order for physical therapy. Please tell her to return the office after completing physical therapy.

## 2021-11-15 ENCOUNTER — OFFICE VISIT (OUTPATIENT)
Dept: FAMILY MEDICINE CLINIC | Facility: CLINIC | Age: 26
End: 2021-11-15
Payer: COMMERCIAL

## 2021-11-15 VITALS
DIASTOLIC BLOOD PRESSURE: 79 MMHG | BODY MASS INDEX: 22.08 KG/M2 | SYSTOLIC BLOOD PRESSURE: 128 MMHG | WEIGHT: 120 LBS | HEIGHT: 62 IN | TEMPERATURE: 97 F | OXYGEN SATURATION: 98 % | HEART RATE: 92 BPM

## 2021-11-15 DIAGNOSIS — Z11.52 ENCOUNTER FOR SCREENING FOR COVID-19: ICD-10-CM

## 2021-11-15 DIAGNOSIS — J02.9 SORE THROAT: Primary | ICD-10-CM

## 2021-11-15 PROCEDURE — 3008F BODY MASS INDEX DOCD: CPT | Performed by: PHYSICIAN ASSISTANT

## 2021-11-15 PROCEDURE — 87880 STREP A ASSAY W/OPTIC: CPT | Performed by: PHYSICIAN ASSISTANT

## 2021-11-15 PROCEDURE — 3074F SYST BP LT 130 MM HG: CPT | Performed by: PHYSICIAN ASSISTANT

## 2021-11-15 PROCEDURE — 87081 CULTURE SCREEN ONLY: CPT | Performed by: PHYSICIAN ASSISTANT

## 2021-11-15 PROCEDURE — 99213 OFFICE O/P EST LOW 20 MIN: CPT | Performed by: PHYSICIAN ASSISTANT

## 2021-11-15 PROCEDURE — 3078F DIAST BP <80 MM HG: CPT | Performed by: PHYSICIAN ASSISTANT

## 2021-11-15 NOTE — PROGRESS NOTES
CHIEF COMPLAINT:   Patient presents with:  Sore Throat: Sore throat with on and off fever of 100.5 at the highest , mucus in throat and nose - Entered by patient      HPI:   Donna Aguirre is a 27year old female who presents to [facility] for COVID-19 test Nausea.  30 tablet 0   • Multiple Vitamins-Minerals (ONE-A-DAY 50 PLUS OR) One A Day   1 daily  pre     vitamin     • gabapentin 100 MG Oral Cap One po tid for 7 days and if pain persists, increase to two po tid (Patient not taking: Reported on 10/8/20 rhonchi. Breathing is non labored. No decreased BS. Speaking in full sentences without hesitation. CARDIO: RRR without murmur  LYMPH:  No lymphadenopathy.       Recent Results (from the past 24 hour(s))   STREP A ASSAY W/OPTIC    Collection Time: 11/15/21 Pharyngitis (Sore Throat), Report Pending     Pharyngitis (sore throat) is often due to a virus. It can also be caused by strep (streptococcus) bacteria. This is often called strep throat.  Both viral and strep infections can cause throat pain that is w medicine (often penicillin or amoxicillin) for the full 10 days or as directed by the healthcare provider. Don't stop the medicine even if you or your child feel better. This is very important to make sure the infection is fully treated.  It's also importan with your healthcare provider or our staff if you or your child don't feel or get better within 72 hours or as directed.    When to get medical advice  Call your healthcare provider right away if any of these occur:   · Your child has a fever (see \"Fever a child of any age with signs of illness. The provider may want to confirm with a rectal temperature. · Mouth (oral). Don’t use a thermometer in your child’s mouth until he or she is at least 3years old. Use the rectal thermometer with care.  Follow the pr

## 2021-11-17 ENCOUNTER — TELEPHONE (OUTPATIENT)
Dept: FAMILY MEDICINE CLINIC | Facility: CLINIC | Age: 26
End: 2021-11-17

## 2021-11-17 RX ORDER — AMOXICILLIN 500 MG/1
500 CAPSULE ORAL 2 TIMES DAILY
Qty: 20 CAPSULE | Refills: 0 | Status: SHIPPED | OUTPATIENT
Start: 2021-11-17 | End: 2021-11-27

## 2021-11-23 ENCOUNTER — LAB ENCOUNTER (OUTPATIENT)
Dept: LAB | Facility: HOSPITAL | Age: 26
End: 2021-11-23
Attending: PHYSICIAN ASSISTANT
Payer: COMMERCIAL

## 2021-11-23 ENCOUNTER — OFFICE VISIT (OUTPATIENT)
Dept: FAMILY MEDICINE CLINIC | Facility: CLINIC | Age: 26
End: 2021-11-23
Payer: COMMERCIAL

## 2021-11-23 VITALS
BODY MASS INDEX: 22.45 KG/M2 | HEIGHT: 62 IN | TEMPERATURE: 99 F | WEIGHT: 122 LBS | OXYGEN SATURATION: 97 % | SYSTOLIC BLOOD PRESSURE: 126 MMHG | DIASTOLIC BLOOD PRESSURE: 80 MMHG | HEART RATE: 99 BPM

## 2021-11-23 DIAGNOSIS — Z11.52 ENCOUNTER FOR SCREENING FOR COVID-19: ICD-10-CM

## 2021-11-23 DIAGNOSIS — J02.9 ACUTE PHARYNGITIS, UNSPECIFIED ETIOLOGY: ICD-10-CM

## 2021-11-23 DIAGNOSIS — J02.9 ACUTE PHARYNGITIS, UNSPECIFIED ETIOLOGY: Primary | ICD-10-CM

## 2021-11-23 PROCEDURE — 3008F BODY MASS INDEX DOCD: CPT | Performed by: PHYSICIAN ASSISTANT

## 2021-11-23 PROCEDURE — 99214 OFFICE O/P EST MOD 30 MIN: CPT | Performed by: PHYSICIAN ASSISTANT

## 2021-11-23 PROCEDURE — 86665 EPSTEIN-BARR CAPSID VCA: CPT

## 2021-11-23 PROCEDURE — 86308 HETEROPHILE ANTIBODY SCREEN: CPT

## 2021-11-23 PROCEDURE — 3074F SYST BP LT 130 MM HG: CPT | Performed by: PHYSICIAN ASSISTANT

## 2021-11-23 PROCEDURE — 36415 COLL VENOUS BLD VENIPUNCTURE: CPT

## 2021-11-23 PROCEDURE — 3079F DIAST BP 80-89 MM HG: CPT | Performed by: PHYSICIAN ASSISTANT

## 2021-11-23 PROCEDURE — 86664 EPSTEIN-BARR NUCLEAR ANTIGEN: CPT

## 2021-11-24 NOTE — PROGRESS NOTES
CHIEF COMPLAINT:   Patient presents with:  Sore Throat: headaches x 1 wk fever on and off, was given Amox due to strep B, still has 5 dys of meds left and was given on the 17th, taking ,meds as prescribed, not vaccinated, no known exposure to covid per p Suspension SPRAY 2 SPRAYS INTO EACH NOSTRIL EVERY DAY 1 Bottle 0   • Multiple Vitamins-Minerals (ONE-A-DAY 50 PLUS OR) One A Day   1 daily  pre amy    vitamin        Past Medical History:   Diagnosis Date   • Cervical radiculopathy    • Esophageal reflux encounter diagnosis)  Encounter for screening for covid-19    Plan: Discussed that due to symptoms and negative group a strep this is most likely viral and NGAS was incidental finding. Can continue amoxicillin if she desires. Mono serum studies ordered.  P

## 2021-11-24 NOTE — PATIENT INSTRUCTIONS
Acute Viral Pharyngitis (Sore Throat)    You or your child have a sore throat (pharyngitis). This infection is caused by a virus. It can cause throat pain that is worse when swallowing, aching all over, headache, and fever.  The infection may be spread by chronic liver or kidney disease or ever had a stomach ulcer or gastrointestinal bleeding, talk with your healthcare provider before using these medicines.    Follow-up care  Follow up with a healthcare provider or as advised if you or your child are not get directions for correct use. Insert it gently. Label it and make sure it’s not used in the mouth. It may pass on germs from the stool. If you don’t feel OK using a rectal thermometer, ask the healthcare provider what type to use instead.  When you talk with with weak immune systems. The virus is usually spread by contact with saliva, often by kissing, or sharing food or eating utensils. It may also spread by breastmilk, blood, or sexual contact. It takes about 4 to 6 weeks to develop symptoms after exposure. better. Continue to follow any activity restrictions you have been given.   Preventing spread of the virus  To limit the spread of the virus, don't expose others to your saliva for at least 6 months after your illness (no kissing or sharing utensils, drinki

## 2021-11-29 ENCOUNTER — TELEPHONE (OUTPATIENT)
Dept: PHYSICAL THERAPY | Facility: HOSPITAL | Age: 26
End: 2021-11-29

## 2021-12-01 ENCOUNTER — TELEPHONE (OUTPATIENT)
Dept: PHYSICAL THERAPY | Facility: HOSPITAL | Age: 26
End: 2021-12-01

## 2021-12-01 ENCOUNTER — APPOINTMENT (OUTPATIENT)
Dept: PHYSICAL THERAPY | Facility: HOSPITAL | Age: 26
End: 2021-12-01
Attending: Other
Payer: COMMERCIAL

## 2021-12-15 ENCOUNTER — OFFICE VISIT (OUTPATIENT)
Dept: PHYSICAL THERAPY | Facility: HOSPITAL | Age: 26
End: 2021-12-15
Attending: Other
Payer: COMMERCIAL

## 2021-12-15 DIAGNOSIS — M54.12 CERVICAL RADICULOPATHY: ICD-10-CM

## 2021-12-15 PROCEDURE — 97161 PT EVAL LOW COMPLEX 20 MIN: CPT

## 2021-12-15 NOTE — PROGRESS NOTES
SPINE EVALUATION:   Referring Physician: Dr. Carlson Records  Diagnosis: Cervical radiculopathy (M54.12)     Date of Service: 12/15/2021     PATIENT India Mendez is a 27year old female who presents to therapy today with complaints of L neck an day, okay by night time  Location: L lateral neck, collar bone (bruised/tender), lateral upper arm, posterior forearm, digits 2-4 palmar aspect  Quality: collarbone is bruised/tender, neck is sharp (if arm as well, at worst) tingling/pinching, arm tingling None  OBJECTIVE:   Observation/Posture: upper crossed, forward head and protracted cervical spine, low scapulae/depressed shoulders  Neuro Screen:   Light touch: L diminished to C5, T1, T2  Reflexes:   Biceps: 2+ symmetrical  tricep: 3+ symmetrical   brach have improved thoracic PA mobility to WNL to improve cervical ROM as well as promote upright posturing and decreased pain    · Pt will report decreased frequency of arm pains to <4x/week  · Pt will demonstrate improved cervical intrinsic strength and endur

## 2021-12-20 ENCOUNTER — OFFICE VISIT (OUTPATIENT)
Dept: PHYSICAL THERAPY | Facility: HOSPITAL | Age: 26
End: 2021-12-20
Attending: Other
Payer: COMMERCIAL

## 2021-12-20 ENCOUNTER — OFFICE VISIT (OUTPATIENT)
Dept: INTERNAL MEDICINE CLINIC | Facility: CLINIC | Age: 26
End: 2021-12-20
Payer: COMMERCIAL

## 2021-12-20 VITALS
WEIGHT: 124.38 LBS | HEIGHT: 64.57 IN | OXYGEN SATURATION: 98 % | DIASTOLIC BLOOD PRESSURE: 80 MMHG | HEART RATE: 85 BPM | TEMPERATURE: 98 F | SYSTOLIC BLOOD PRESSURE: 124 MMHG | BODY MASS INDEX: 20.98 KG/M2

## 2021-12-20 DIAGNOSIS — I34.0 MITRAL VALVE INSUFFICIENCY, UNSPECIFIED ETIOLOGY: ICD-10-CM

## 2021-12-20 DIAGNOSIS — T78.40XD ALLERGY, SUBSEQUENT ENCOUNTER: ICD-10-CM

## 2021-12-20 DIAGNOSIS — M54.12 CERVICAL RADICULOPATHY: ICD-10-CM

## 2021-12-20 DIAGNOSIS — K21.9 GASTROESOPHAGEAL REFLUX DISEASE, UNSPECIFIED WHETHER ESOPHAGITIS PRESENT: Primary | ICD-10-CM

## 2021-12-20 DIAGNOSIS — E55.9 VITAMIN D DEFICIENCY: ICD-10-CM

## 2021-12-20 DIAGNOSIS — R53.83 TIREDNESS: ICD-10-CM

## 2021-12-20 DIAGNOSIS — E87.6 HYPOKALEMIA: ICD-10-CM

## 2021-12-20 DIAGNOSIS — J45.20 MILD INTERMITTENT ASTHMA WITHOUT COMPLICATION: ICD-10-CM

## 2021-12-20 PROCEDURE — 3008F BODY MASS INDEX DOCD: CPT | Performed by: INTERNAL MEDICINE

## 2021-12-20 PROCEDURE — 99215 OFFICE O/P EST HI 40 MIN: CPT | Performed by: INTERNAL MEDICINE

## 2021-12-20 PROCEDURE — 3079F DIAST BP 80-89 MM HG: CPT | Performed by: INTERNAL MEDICINE

## 2021-12-20 PROCEDURE — 97110 THERAPEUTIC EXERCISES: CPT

## 2021-12-20 PROCEDURE — 97140 MANUAL THERAPY 1/> REGIONS: CPT

## 2021-12-20 PROCEDURE — 3074F SYST BP LT 130 MM HG: CPT | Performed by: INTERNAL MEDICINE

## 2021-12-20 RX ORDER — SODIUM FLUORIDE 5 MG/ML
PASTE, DENTIFRICE DENTAL
COMMUNITY
Start: 2021-12-08

## 2021-12-20 NOTE — PROGRESS NOTES
HPI:    Patient ID: Renetta Paulino is a 27year old female. HPI    Patient is here to establish care. She sees Dr. Omar Johnson. H/o MVP. GERD.,  Chronically erratic bowel movements, hyper reactive airway .     She sees Neuro,  Dr Jerome Oneil for Tagorize HIVES  Strawberries            HIVES  Sulfamethoxazole W/*    HIVES  Dust Mite Extract       Runny nose  Trichophyton            Coughing    HISTORY:  Past Medical History:   Diagnosis Date   • Cervical radiculopathy    • Esophageal reflux    • Iker 100 MG Oral Tab Take 100 mg by mouth daily. • ondansetron (ZOFRAN ODT) 4 MG Oral Tablet Dispersible Take 1 tablet (4 mg total) by mouth every 8 (eight) hours as needed for Nausea.  30 tablet 0   • Cholecalciferol (VITAMIN D3 OR) Take 2,000 Int'l Units b Absolute      0.10 - 1.00 x10(3) uL  0.78   Eosinophils Absolute      0.00 - 0.70 x10(3) uL  0.07   Basophils Absolute      0.00 - 0.20 x10(3) uL  0.05   Immature Granulocyte Absolute      0.00 - 1.00 x10(3) uL  0.06   Neutrophils %      %  55.1   Lymphocy atrium was mildly dilated. No previous study was available for comparison        Physical Exam  Vitals and nursing note reviewed. Constitutional:       Appearance: Normal appearance. HENT:      Head: Normocephalic.    Neck:      Vascular: No carotid b Future  - FOLIC ACID SERUM(FOLATE); Future    F/u in 6 mos, sooner, prn.   Orders Placed This Encounter      Potassium [E]      CMP      Vitamin D, 25-Hydroxy      Vitamin S59      Folic Acid Serum (Folate)    F/u in 6 mos, sooner prn    Imaging & Referrals

## 2021-12-20 NOTE — PROGRESS NOTES
Dx: Cervical radiculopathy (M54.12)             Insurance (Authorized # of Visits):  Shamrin Rutledge PPO 2/8           Authorizing Physician: Dr. Christofer Aguilar  Next MD visit: none scheduled  Fall Risk: standard         Precautions: n/a             Subjective:   Doing fine, Manual Therapy, Neuromuscular Re-education, Therapeutic Activities, Therapeutic Exercise and Home Exercise Program instruction     Date: 12/20/2021  TX#: 2/8 Date:                 TX#: 3/ Date:                 TX#: 4/ Date:                 TX#: 5/ Date:

## 2021-12-22 ENCOUNTER — OFFICE VISIT (OUTPATIENT)
Dept: PHYSICAL THERAPY | Facility: HOSPITAL | Age: 26
End: 2021-12-22
Attending: Other
Payer: COMMERCIAL

## 2021-12-22 PROCEDURE — 97110 THERAPEUTIC EXERCISES: CPT

## 2021-12-22 PROCEDURE — 97140 MANUAL THERAPY 1/> REGIONS: CPT

## 2021-12-22 RX ORDER — CETIRIZINE HYDROCHLORIDE 10 MG/1
10 TABLET ORAL
COMMUNITY

## 2021-12-22 NOTE — PROGRESS NOTES
Dx: Cervical radiculopathy (M54.12)             Insurance (Authorized # of Visits):  Marlen Garibay [place] 3/1           Authorizing Physician: Dr. Teresa Richey  Next MD visit: none scheduled  Fall Risk: standard         Precautions: n/a             Subjective:   Cervical re Therapy, Neuromuscular Re-education, Therapeutic Activities, Therapeutic Exercise and Home Exercise Program instruction     Date: 12/20/2021  TX#: 2/8 Date:   12/22/2021          TX#: 3/8  Date:                 TX#: 4/ Date:                 TX#: 5/ Date:

## 2021-12-27 ENCOUNTER — APPOINTMENT (OUTPATIENT)
Dept: PHYSICAL THERAPY | Facility: HOSPITAL | Age: 26
End: 2021-12-27
Attending: Other
Payer: COMMERCIAL

## 2021-12-29 ENCOUNTER — OFFICE VISIT (OUTPATIENT)
Dept: PHYSICAL THERAPY | Facility: HOSPITAL | Age: 26
End: 2021-12-29
Attending: Other
Payer: COMMERCIAL

## 2021-12-29 PROCEDURE — 97140 MANUAL THERAPY 1/> REGIONS: CPT

## 2021-12-29 PROCEDURE — 97110 THERAPEUTIC EXERCISES: CPT

## 2021-12-29 NOTE — PROGRESS NOTES
Dx: Cervical radiculopathy (M54.12)             Insurance (Authorized # of Visits):  [employer] PPO 4/8           Authorizing Physician: Dr. Ameena Mustafa  Next MD visit: none scheduled  Fall Risk: standard         Precautions: n/a             Subjective:   Some pinchi upright posturing and decreased pain   · Pt will be able to lift daughter without increase in neck pain   · Pt will be independent and compliant with comprehensive HEP to maintain progress achieved in PT       Plan:   Patient will be seen for 1-2 x/week or

## 2022-01-04 ENCOUNTER — OFFICE VISIT (OUTPATIENT)
Dept: PHYSICAL THERAPY | Facility: HOSPITAL | Age: 27
End: 2022-01-04
Attending: Other
Payer: COMMERCIAL

## 2022-01-04 PROCEDURE — 97110 THERAPEUTIC EXERCISES: CPT

## 2022-01-04 PROCEDURE — 97140 MANUAL THERAPY 1/> REGIONS: CPT

## 2022-01-04 NOTE — PROGRESS NOTES
Dx: Cervical radiculopathy (M54.12)             Insurance (Authorized # of Visits):  [facility] 4/8           Authorizing Physician: Dr. Fenton Leader  Next MD visit: none scheduled  Fall Risk: standard         Precautions: n/a             Subjective:   L sided nec will be independent and compliant with comprehensive HEP to maintain progress achieved in PT       Plan: traction rotation, closed chain rotation, feed forward rotation   Patient will be seen for 1-2 x/week or a total of 8 visits over a 90 day period.  Temi

## 2022-01-11 ENCOUNTER — APPOINTMENT (OUTPATIENT)
Dept: PHYSICAL THERAPY | Facility: HOSPITAL | Age: 27
End: 2022-01-11
Attending: Other
Payer: COMMERCIAL

## 2022-01-19 ENCOUNTER — APPOINTMENT (OUTPATIENT)
Dept: PHYSICAL THERAPY | Facility: HOSPITAL | Age: 27
End: 2022-01-19
Attending: Other
Payer: COMMERCIAL

## 2022-01-19 ENCOUNTER — TELEPHONE (OUTPATIENT)
Dept: PHYSICAL THERAPY | Facility: HOSPITAL | Age: 27
End: 2022-01-19

## 2022-01-27 ENCOUNTER — OFFICE VISIT (OUTPATIENT)
Dept: PHYSICAL THERAPY | Facility: HOSPITAL | Age: 27
End: 2022-01-27
Attending: Other
Payer: COMMERCIAL

## 2022-01-27 ENCOUNTER — LAB ENCOUNTER (OUTPATIENT)
Dept: LAB | Facility: HOSPITAL | Age: 27
End: 2022-01-27
Attending: INTERNAL MEDICINE
Payer: COMMERCIAL

## 2022-01-27 DIAGNOSIS — E87.6 HYPOKALEMIA: ICD-10-CM

## 2022-01-27 DIAGNOSIS — R53.83 TIREDNESS: ICD-10-CM

## 2022-01-27 LAB
ALBUMIN SERPL-MCNC: 4.1 G/DL (ref 3.4–5)
ALBUMIN/GLOB SERPL: 1.2 {RATIO} (ref 1–2)
ALP LIVER SERPL-CCNC: 57 U/L
ALT SERPL-CCNC: 17 U/L
ANION GAP SERPL CALC-SCNC: 4 MMOL/L (ref 0–18)
AST SERPL-CCNC: 14 U/L (ref 15–37)
BILIRUB SERPL-MCNC: 0.5 MG/DL (ref 0.1–2)
BUN BLD-MCNC: 11 MG/DL (ref 7–18)
BUN/CREAT SERPL: 16.7 (ref 10–20)
CALCIUM BLD-MCNC: 9.3 MG/DL (ref 8.5–10.1)
CHLORIDE SERPL-SCNC: 104 MMOL/L (ref 98–112)
CO2 SERPL-SCNC: 30 MMOL/L (ref 21–32)
CREAT BLD-MCNC: 0.66 MG/DL
FASTING STATUS PATIENT QL REPORTED: NO
FOLATE SERPL-MCNC: >20 NG/ML (ref 8.7–?)
GLOBULIN PLAS-MCNC: 3.4 G/DL (ref 2.8–4.4)
GLUCOSE BLD-MCNC: 84 MG/DL (ref 70–99)
OSMOLALITY SERPL CALC.SUM OF ELEC: 285 MOSM/KG (ref 275–295)
POTASSIUM SERPL-SCNC: 3.9 MMOL/L (ref 3.5–5.1)
PROT SERPL-MCNC: 7.5 G/DL (ref 6.4–8.2)
SODIUM SERPL-SCNC: 138 MMOL/L (ref 136–145)
VIT B12 SERPL-MCNC: 439 PG/ML (ref 193–986)
VIT D+METAB SERPL-MCNC: 22.2 NG/ML (ref 30–100)

## 2022-01-27 PROCEDURE — 82306 VITAMIN D 25 HYDROXY: CPT

## 2022-01-27 PROCEDURE — 97110 THERAPEUTIC EXERCISES: CPT

## 2022-01-27 PROCEDURE — 82607 VITAMIN B-12: CPT

## 2022-01-27 PROCEDURE — 80053 COMPREHEN METABOLIC PANEL: CPT

## 2022-01-27 PROCEDURE — 36415 COLL VENOUS BLD VENIPUNCTURE: CPT

## 2022-01-27 PROCEDURE — 97140 MANUAL THERAPY 1/> REGIONS: CPT

## 2022-01-27 PROCEDURE — 82746 ASSAY OF FOLIC ACID SERUM: CPT

## 2022-01-27 NOTE — PROGRESS NOTES
Dx: Cervical radiculopathy (M54.12)             Insurance (Authorized # of Visits):  Hildegard Meckel PPO 6/8           Authorizing Physician: Dr. Alice Ji  Next MD visit: none scheduled  Fall Risk: standard         Precautions: n/a             Subjective:   Last week w strength and enduranc to allow improved cervical stabilization with overhead reaching   · Pt will improve postural strength (mid/low trap) to 4/5 to promote improved upright posturing and decreased pain   · Pt will be able to lift daughter without increase 10x5-10s holds   Traction with seated body rotation 2x10   Shoulder shrugs 5# 2x10  scaption 1# 2x10 , cue maintain cervical retraction      TE:   Diaphragmatic breathing training in hooklie  Chin tuck head lift, 3x5, 10s holds   Body rotation 4# MB (slow

## 2022-01-31 ENCOUNTER — OFFICE VISIT (OUTPATIENT)
Dept: PHYSICAL THERAPY | Facility: HOSPITAL | Age: 27
End: 2022-01-31
Attending: Other
Payer: COMMERCIAL

## 2022-01-31 PROCEDURE — 97140 MANUAL THERAPY 1/> REGIONS: CPT

## 2022-01-31 PROCEDURE — 97110 THERAPEUTIC EXERCISES: CPT

## 2022-02-03 RX ORDER — MULTIVIT-MIN/IRON/FOLIC ACID/K 18-600-40
1 CAPSULE ORAL DAILY
Qty: 90 CAPSULE | Refills: 0 | Status: SHIPPED | OUTPATIENT
Start: 2022-02-03 | End: 2022-03-05

## 2022-02-09 ENCOUNTER — OFFICE VISIT (OUTPATIENT)
Dept: PHYSICAL THERAPY | Facility: HOSPITAL | Age: 27
End: 2022-02-09
Attending: Other
Payer: COMMERCIAL

## 2022-02-09 ENCOUNTER — TELEPHONE (OUTPATIENT)
Dept: PHYSICAL THERAPY | Facility: HOSPITAL | Age: 27
End: 2022-02-09

## 2022-02-09 PROCEDURE — 97110 THERAPEUTIC EXERCISES: CPT

## 2022-02-16 ENCOUNTER — APPOINTMENT (OUTPATIENT)
Dept: PHYSICAL THERAPY | Facility: HOSPITAL | Age: 27
End: 2022-02-16
Attending: Other
Payer: COMMERCIAL

## 2022-02-21 ENCOUNTER — OFFICE VISIT (OUTPATIENT)
Dept: GASTROENTEROLOGY | Facility: CLINIC | Age: 27
End: 2022-02-21
Payer: COMMERCIAL

## 2022-02-21 ENCOUNTER — OFFICE VISIT (OUTPATIENT)
Dept: PHYSICAL THERAPY | Facility: HOSPITAL | Age: 27
End: 2022-02-21
Attending: Other
Payer: COMMERCIAL

## 2022-02-21 VITALS
HEIGHT: 64 IN | HEART RATE: 90 BPM | BODY MASS INDEX: 20.86 KG/M2 | SYSTOLIC BLOOD PRESSURE: 118 MMHG | DIASTOLIC BLOOD PRESSURE: 67 MMHG | WEIGHT: 122.19 LBS

## 2022-02-21 DIAGNOSIS — R11.0 NAUSEA: Primary | ICD-10-CM

## 2022-02-21 PROCEDURE — 3074F SYST BP LT 130 MM HG: CPT | Performed by: INTERNAL MEDICINE

## 2022-02-21 PROCEDURE — 97110 THERAPEUTIC EXERCISES: CPT

## 2022-02-21 PROCEDURE — 99213 OFFICE O/P EST LOW 20 MIN: CPT | Performed by: INTERNAL MEDICINE

## 2022-02-21 PROCEDURE — 3078F DIAST BP <80 MM HG: CPT | Performed by: INTERNAL MEDICINE

## 2022-02-21 PROCEDURE — 3008F BODY MASS INDEX DOCD: CPT | Performed by: INTERNAL MEDICINE

## 2022-02-21 RX ORDER — DEXAMETHASONE 4 MG/1
2 TABLET ORAL 2 TIMES DAILY
COMMUNITY
Start: 2022-01-27

## 2022-02-21 RX ORDER — ONDANSETRON 4 MG/1
4 TABLET, ORALLY DISINTEGRATING ORAL EVERY 8 HOURS PRN
Qty: 30 TABLET | Refills: 1 | Status: SHIPPED | OUTPATIENT
Start: 2022-02-21 | End: 2022-03-08

## 2022-02-21 RX ORDER — ALBUTEROL SULFATE 90 UG/1
2 AEROSOL, METERED RESPIRATORY (INHALATION) AS NEEDED
COMMUNITY
Start: 2022-01-29

## 2022-02-21 RX ORDER — SERTRALINE HYDROCHLORIDE 25 MG/1
25 TABLET, FILM COATED ORAL DAILY
COMMUNITY
Start: 2022-02-17 | End: 2022-03-10

## 2022-02-21 RX ORDER — PANTOPRAZOLE SODIUM 40 MG/1
40 TABLET, DELAYED RELEASE ORAL
Qty: 60 TABLET | Refills: 3 | Status: SHIPPED | OUTPATIENT
Start: 2022-02-21

## 2022-02-22 ENCOUNTER — TELEPHONE (OUTPATIENT)
Dept: GASTROENTEROLOGY | Facility: CLINIC | Age: 27
End: 2022-02-22

## 2022-02-22 NOTE — TELEPHONE ENCOUNTER
Patient indicates rx pantoprozole twice a day is not covered by insurance. Patient asking if it should be changed to once a day or alternative medication can be sent. Please call at [phone],VLID.

## 2022-02-23 ENCOUNTER — OFFICE VISIT (OUTPATIENT)
Dept: INTERNAL MEDICINE CLINIC | Facility: CLINIC | Age: 27
End: 2022-02-23
Payer: COMMERCIAL

## 2022-02-23 VITALS
BODY MASS INDEX: 20.32 KG/M2 | DIASTOLIC BLOOD PRESSURE: 84 MMHG | OXYGEN SATURATION: 98 % | SYSTOLIC BLOOD PRESSURE: 124 MMHG | WEIGHT: 119 LBS | HEIGHT: 64 IN | HEART RATE: 89 BPM

## 2022-02-23 DIAGNOSIS — K21.9 GASTROESOPHAGEAL REFLUX DISEASE, UNSPECIFIED WHETHER ESOPHAGITIS PRESENT: ICD-10-CM

## 2022-02-23 DIAGNOSIS — N91.2 AMENORRHEA: Primary | ICD-10-CM

## 2022-02-23 DIAGNOSIS — F41.9 ANXIETY: ICD-10-CM

## 2022-02-23 LAB
B-HCG SERPL-ACNC: <1 MIU/ML
CONTROL LINE PRESENT WITH A CLEAR BACKGROUND (YES/NO): POSITIVE YES/NO
PREGNANCY TEST, URINE: NEGATIVE

## 2022-02-23 PROCEDURE — 99214 OFFICE O/P EST MOD 30 MIN: CPT | Performed by: INTERNAL MEDICINE

## 2022-02-23 PROCEDURE — 3074F SYST BP LT 130 MM HG: CPT | Performed by: INTERNAL MEDICINE

## 2022-02-23 PROCEDURE — 81025 URINE PREGNANCY TEST: CPT | Performed by: INTERNAL MEDICINE

## 2022-02-23 PROCEDURE — 84702 CHORIONIC GONADOTROPIN TEST: CPT | Performed by: INTERNAL MEDICINE

## 2022-02-23 PROCEDURE — 3079F DIAST BP 80-89 MM HG: CPT | Performed by: INTERNAL MEDICINE

## 2022-02-23 PROCEDURE — 3008F BODY MASS INDEX DOCD: CPT | Performed by: INTERNAL MEDICINE

## 2022-02-23 NOTE — TELEPHONE ENCOUNTER
[address]. P: [id number]    Pantoprazole 40 mg twice daily ordered during follow up visit (2022). Spoke with [facility] (pharmacist). Verified patient name and . Prior authorization is required for new prescription 2/2 increase in daily dosage. Contacted [employer] to initiate pa P: [phone]    Spoke with Greg Cole to begin authorization process. Provided new prescription dose/frequency, chart notes for support and primary dx codes. Authorization for increase ppi therapy twice daily approved. Authorization #: [id number]    Validation: 2022-2023    Fax confirmation for rx approval sent to office fax number. Verified with pharmacy able to process through their system. Cost: $5.20 and typically will be ready for  x 30 min. Left detailed message per ADAM wade updating Camille on approval for new prescription.

## 2022-02-23 NOTE — PATIENT INSTRUCTIONS
1.  Increase pantoprazole to 40 mg twice daily before breakfast and supper. 2.  May take ondansetron (Zofran) every 6 hours as needed. 3.  Please contact me with a symptom update in the next few weeks. If your symptoms continue I would recommend a repeat upper endoscopy.

## 2022-02-24 ENCOUNTER — TELEPHONE (OUTPATIENT)
Dept: INTERNAL MEDICINE CLINIC | Facility: CLINIC | Age: 27
End: 2022-02-24

## 2022-02-24 NOTE — TELEPHONE ENCOUNTER
Pt is calling for test results. Pt also stated that she is still not feeling well and want to know about test results and what else can be done.       Please call and advise

## 2022-02-28 ENCOUNTER — PATIENT MESSAGE (OUTPATIENT)
Dept: GASTROENTEROLOGY | Facility: CLINIC | Age: 27
End: 2022-02-28

## 2022-02-28 ENCOUNTER — APPOINTMENT (OUTPATIENT)
Dept: PHYSICAL THERAPY | Facility: HOSPITAL | Age: 27
End: 2022-02-28
Attending: Other
Payer: COMMERCIAL

## 2022-02-28 NOTE — TELEPHONE ENCOUNTER
Dr. Soraya Shepherd-    Please see/advise on patient's symptom update in message below since starting Zoloft 25 mg and increasing Pantoprazole to 40 mg BID at [address] [place] [address] on 2-21-22.

## 2022-03-01 NOTE — TELEPHONE ENCOUNTER
I spoke to the patient. She continues to note anorexia and nausea. Her weight is down to #118. Pregnancy test was negative. I suspect situational stress/anxiety as the cause. We discussed that the Zoloft will take some time to be effective. I have suggested that she drink 1 or 2 servings of [place] instant breakfast with almond milk or Boost or Ensure once or twice daily. If the patient's symptoms continue beyond an additional few weeks an upper endoscopy would be advised. She will contact me if this is the case.

## 2022-03-08 ENCOUNTER — OFFICE VISIT (OUTPATIENT)
Dept: NEUROLOGY | Facility: CLINIC | Age: 27
End: 2022-03-08
Payer: COMMERCIAL

## 2022-03-08 VITALS
WEIGHT: 118 LBS | HEIGHT: 64 IN | SYSTOLIC BLOOD PRESSURE: 102 MMHG | DIASTOLIC BLOOD PRESSURE: 68 MMHG | BODY MASS INDEX: 20.14 KG/M2

## 2022-03-08 DIAGNOSIS — M54.12 CERVICAL RADICULOPATHY: Primary | ICD-10-CM

## 2022-03-08 PROCEDURE — 3074F SYST BP LT 130 MM HG: CPT | Performed by: OTHER

## 2022-03-08 PROCEDURE — 3008F BODY MASS INDEX DOCD: CPT | Performed by: OTHER

## 2022-03-08 PROCEDURE — 3078F DIAST BP <80 MM HG: CPT | Performed by: OTHER

## 2022-03-08 PROCEDURE — 99213 OFFICE O/P EST LOW 20 MIN: CPT | Performed by: OTHER

## 2023-01-27 ENCOUNTER — TELEPHONE (OUTPATIENT)
Dept: INTERNAL MEDICINE CLINIC | Facility: CLINIC | Age: 28
End: 2023-01-27

## 2023-01-31 ENCOUNTER — PATIENT OUTREACH (OUTPATIENT)
Dept: CASE MANAGEMENT | Age: 28
End: 2023-01-31

## 2023-01-31 NOTE — PROCEDURES
The office order for PCP request is Approved and finalized on January 31, 2023.     Thanks,  [facility] [employer]

## 2023-09-15 ENCOUNTER — HOSPITAL ENCOUNTER (OUTPATIENT)
Age: 28
Discharge: HOME | End: 2023-09-15
Payer: COMMERCIAL

## 2023-09-15 ENCOUNTER — APPOINTMENT (OUTPATIENT)
Dept: GENERAL RADIOLOGY | Age: 28
End: 2023-09-15
Attending: PHYSICIAN ASSISTANT
Payer: COMMERCIAL

## 2023-09-15 VITALS
WEIGHT: 140 LBS | TEMPERATURE: 99 F | HEART RATE: 87 BPM | SYSTOLIC BLOOD PRESSURE: 136 MMHG | OXYGEN SATURATION: 98 % | HEIGHT: 64 IN | DIASTOLIC BLOOD PRESSURE: 81 MMHG | BODY MASS INDEX: 23.9 KG/M2 | RESPIRATION RATE: 18 BRPM

## 2023-09-15 DIAGNOSIS — S19.9XXA INJURY OF NECK, INITIAL ENCOUNTER: ICD-10-CM

## 2023-09-15 DIAGNOSIS — S29.012A STRAIN OF THORACIC BACK REGION: ICD-10-CM
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
## 2023-09-15 DIAGNOSIS — S39.012A STRAIN OF LUMBAR REGION, INITIAL ENCOUNTER: Primary | ICD-10-CM

## 2023-09-15 PROCEDURE — 72040 X-RAY EXAM NECK SPINE 2-3 VW: CPT | Performed by: PHYSICIAN ASSISTANT

## 2023-09-15 PROCEDURE — 72072 X-RAY EXAM THORAC SPINE 3VWS: CPT | Performed by: PHYSICIAN ASSISTANT

## 2023-09-15 PROCEDURE — 72100 X-RAY EXAM L-S SPINE 2/3 VWS: CPT | Performed by: PHYSICIAN ASSISTANT

## 2023-09-15 PROCEDURE — 99213 OFFICE O/P EST LOW 20 MIN: CPT | Performed by: PHYSICIAN ASSISTANT

## 2023-09-15 RX ORDER — LIDOCAINE 50 MG/G
1 PATCH TOPICAL EVERY 24 HOURS
Qty: 5 PATCH | Refills: 0 | Status: SHIPPED | OUTPATIENT
Start: 2023-09-15 | End: 2023-09-20

## 2023-09-15 RX ORDER — CYCLOBENZAPRINE HCL 10 MG
10 TABLET ORAL 3 TIMES DAILY PRN
Qty: 14 TABLET | Refills: 0 | Status: SHIPPED | OUTPATIENT
Start: 2023-09-15 | End: 2023-09-22

## 2023-09-15 RX ORDER — IBUPROFEN 600 MG/1
TABLET ORAL
Qty: 20 TABLET | Refills: 0 | Status: SHIPPED | OUTPATIENT
Start: 2023-09-15
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
## 2024-09-27 ENCOUNTER — WALK IN (OUTPATIENT)
Dept: URGENT CARE | Age: 29
End: 2024-09-27

## 2024-09-27 VITALS
TEMPERATURE: 98.2 F | HEART RATE: 86 BPM | DIASTOLIC BLOOD PRESSURE: 70 MMHG | RESPIRATION RATE: 16 BRPM | SYSTOLIC BLOOD PRESSURE: 110 MMHG | OXYGEN SATURATION: 100 %

## 2024-09-27 DIAGNOSIS — R35.0 URINARY FREQUENCY: Primary | ICD-10-CM

## 2024-09-27 LAB
APPEARANCE, POC: ABNORMAL
BILIRUBIN, POC: NEGATIVE
COLOR, POC: ABNORMAL
GLUCOSE UR-MCNC: NEGATIVE MG/DL
INTERNAL PROCEDURAL CONTROLS ACCEPTABLE: YES
KETONES, POC: NEGATIVE MG/DL
NITRITE, POC: NEGATIVE
OCCULT BLOOD, POC: NEGATIVE
PH UR: 5 [PH] (ref 5–7)
PROT UR-MCNC: NEGATIVE MG/DL
SP GR UR: 1.03 (ref 1–1.03)
TEST LOT EXPIRATION DATE: ABNORMAL
TEST LOT NUMBER: ABNORMAL
UROBILINOGEN UR-MCNC: 0.2 MG/DL (ref 0–1)
WBC (LEUKOCYTE) ESTERASE, POC: ABNORMAL

## 2024-09-27 RX ORDER — NITROFURANTOIN 25; 75 MG/1; MG/1
100 CAPSULE ORAL 2 TIMES DAILY
Qty: 10 CAPSULE | Refills: 0 | Status: SHIPPED | OUTPATIENT
Start: 2024-09-27 | End: 2024-10-02

## 2024-09-27 ASSESSMENT — ENCOUNTER SYMPTOMS
FEVER: 0
DIARRHEA: 0
NAUSEA: 1
VOMITING: 0
ABDOMINAL PAIN: 1
BACK PAIN: 0

## 2024-09-28 LAB
BACTERIA UR CULT: ABNORMAL
BACTERIA UR CULT: ABNORMAL

## 2024-09-29 LAB — BACTERIA UR CULT: ABNORMAL

## 2024-10-03 ENCOUNTER — APPOINTMENT (OUTPATIENT)
Dept: URGENT CARE | Age: 29
End: 2024-10-03

## 2024-10-10 ENCOUNTER — V-VISIT (OUTPATIENT)
Dept: URGENT CARE | Age: 29
End: 2024-10-10

## 2024-10-10 ENCOUNTER — APPOINTMENT (OUTPATIENT)
Dept: URGENT CARE | Age: 29
End: 2024-10-10

## 2024-10-10 VITALS
DIASTOLIC BLOOD PRESSURE: 78 MMHG | TEMPERATURE: 98.3 F | OXYGEN SATURATION: 98 % | BODY MASS INDEX: 23.9 KG/M2 | SYSTOLIC BLOOD PRESSURE: 130 MMHG | RESPIRATION RATE: 16 BRPM | HEART RATE: 70 BPM | WEIGHT: 140 LBS | HEIGHT: 64 IN

## 2024-10-10 DIAGNOSIS — J02.9 ACUTE PHARYNGITIS, UNSPECIFIED ETIOLOGY: Primary | ICD-10-CM

## 2024-10-10 RX ORDER — FAMOTIDINE 20 MG/1
20 TABLET, FILM COATED ORAL
COMMUNITY

## 2024-10-10 ASSESSMENT — ENCOUNTER SYMPTOMS
EYE REDNESS: 0
HEADACHES: 1
NAUSEA: 1
FATIGUE: 1
FEVER: 0
DIARRHEA: 0
SORE THROAT: 1
EYE ITCHING: 0
VOMITING: 0
EYE DISCHARGE: 0
RHINORRHEA: 1
DIZZINESS: 1
CHILLS: 0
COUGH: 1

## 2024-10-10 ASSESSMENT — PAIN SCALES - GENERAL: PAINLEVEL: 6

## 2024-10-12 ENCOUNTER — TELEPHONE (OUTPATIENT)
Dept: FAMILY MEDICINE | Age: 29
End: 2024-10-12

## 2024-10-12 LAB — S PYO SPEC QL CULT: NORMAL

## 2024-10-28 ENCOUNTER — APPOINTMENT (OUTPATIENT)
Dept: URGENT CARE | Age: 29
End: 2024-10-28

## 2024-10-29 ENCOUNTER — V-VISIT (OUTPATIENT)
Dept: URGENT CARE | Age: 29
End: 2024-10-29

## 2024-10-29 ENCOUNTER — APPOINTMENT (OUTPATIENT)
Dept: URGENT CARE | Age: 29
End: 2024-10-29

## 2024-10-29 VITALS
OXYGEN SATURATION: 97 % | DIASTOLIC BLOOD PRESSURE: 70 MMHG | TEMPERATURE: 97.8 F | WEIGHT: 140 LBS | RESPIRATION RATE: 16 BRPM | HEIGHT: 64 IN | SYSTOLIC BLOOD PRESSURE: 119 MMHG | BODY MASS INDEX: 23.9 KG/M2 | HEART RATE: 107 BPM

## 2024-10-29 DIAGNOSIS — J02.9 SORETHROAT: Primary | ICD-10-CM

## 2024-10-29 PROBLEM — M54.12 CERVICAL RADICULOPATHY: Status: ACTIVE | Noted: 2020-08-04

## 2024-10-29 PROBLEM — Z67.91 RHD NEGATIVE: Status: ACTIVE | Noted: 2024-10-29

## 2024-10-29 PROBLEM — I34.1 MITRAL VALVE PROLAPSE: Status: ACTIVE | Noted: 2024-10-29

## 2024-10-29 LAB
INTERNAL PROCEDURAL CONTROLS ACCEPTABLE: YES
S PYO AG THROAT QL IA.RAPID: NEGATIVE
TEST LOT EXPIRATION DATE: NORMAL
TEST LOT NUMBER: NORMAL

## 2024-10-29 PROCEDURE — 99213 OFFICE O/P EST LOW 20 MIN: CPT | Performed by: NURSE PRACTITIONER

## 2024-10-29 PROCEDURE — 87081 CULTURE SCREEN ONLY: CPT | Performed by: CLINICAL MEDICAL LABORATORY

## 2024-10-29 PROCEDURE — 87880 STREP A ASSAY W/OPTIC: CPT | Performed by: NURSE PRACTITIONER

## 2024-10-31 LAB — S PYO SPEC QL CULT: NORMAL

## 2024-11-24 ENCOUNTER — HOSPITAL ENCOUNTER (OUTPATIENT)
Age: 29
Discharge: HOME | End: 2024-11-24
Payer: COMMERCIAL

## 2024-11-24 VITALS
TEMPERATURE: 98 F | HEART RATE: 73 BPM | RESPIRATION RATE: 18 BRPM | SYSTOLIC BLOOD PRESSURE: 108 MMHG | DIASTOLIC BLOOD PRESSURE: 67 MMHG | OXYGEN SATURATION: 100 %

## 2024-11-24 DIAGNOSIS — A08.4 VIRAL GASTROENTERITIS: Primary | ICD-10-CM

## 2024-11-24 DIAGNOSIS — R19.7 DIARRHEA, UNSPECIFIED TYPE: ICD-10-CM

## 2024-11-24 LAB
#MXD IC: 0.8 X10ˆ3/UL (ref 0.1–1)
B-HCG UR QL: NEGATIVE
BILIRUB UR QL STRIP: NEGATIVE
BUN BLD-MCNC: 7 MG/DL (ref 7–18)
CHLORIDE BLD-SCNC: 102 MMOL/L (ref 98–112)
CO2 BLD-SCNC: 22 MMOL/L (ref 21–32)
COLOR UR: YELLOW
CREAT BLD-MCNC: 0.6 MG/DL
EGFRCR SERPLBLD CKD-EPI 2021: 123 ML/MIN/1.73M2 (ref 60–?)
GLUCOSE BLD-MCNC: 84 MG/DL (ref 70–99)
GLUCOSE UR STRIP-MCNC: NEGATIVE MG/DL
HCT VFR BLD AUTO: 44.5 %
HCT VFR BLD CALC: 48 %
HGB BLD-MCNC: 14.6 G/DL
ISTAT IONIZED CALCIUM FOR CHEM 8: 1.18 MMOL/L (ref 1.12–1.32)
KETONES UR STRIP-MCNC: 40 MG/DL
LYMPHOCYTES # BLD AUTO: 1.6 X10ˆ3/UL (ref 1–4)
LYMPHOCYTES NFR BLD AUTO: 28.8 %
MCH RBC QN AUTO: 28.6 PG (ref 26–34)
MCHC RBC AUTO-ENTMCNC: 32.8 G/DL (ref 31–37)
MCV RBC AUTO: 87.3 FL (ref 80–100)
MIXED CELL %: 14 %
NEUTROPHILS # BLD AUTO: 3 X10ˆ3/UL (ref 1.5–7.7)
NEUTROPHILS NFR BLD AUTO: 57.2 %
NITRITE UR QL STRIP: NEGATIVE
PH UR STRIP: 5.5 [PH]
PLATELET # BLD AUTO: 198 X10ˆ3/UL (ref 150–450)
POTASSIUM BLD-SCNC: 3.4 MMOL/L (ref 3.6–5.1)
RBC # BLD AUTO: 5.1 X10ˆ6/UL
SODIUM BLD-SCNC: 137 MMOL/L (ref 136–145)
SP GR UR STRIP: >=1.03
UROBILINOGEN UR STRIP-ACNC: <2 MG/DL
WBC # BLD AUTO: 5.4 X10ˆ3/UL (ref 4–11)

## 2024-11-24 PROCEDURE — 87086 URINE CULTURE/COLONY COUNT: CPT | Performed by: NURSE PRACTITIONER

## 2024-11-24 RX ORDER — FAMOTIDINE 10 MG/ML
20 INJECTION, SOLUTION INTRAVENOUS ONCE
Status: COMPLETED | OUTPATIENT
Start: 2024-11-24 | End: 2024-11-24

## 2024-11-24 RX ORDER — POTASSIUM CHLORIDE 1500 MG/1
40 TABLET, EXTENDED RELEASE ORAL ONCE
Status: COMPLETED | OUTPATIENT
Start: 2024-11-24 | End: 2024-11-24

## 2024-11-24 RX ORDER — DICYCLOMINE HYDROCHLORIDE 10 MG/5ML
20 SOLUTION ORAL ONCE
Status: DISCONTINUED | OUTPATIENT
Start: 2024-11-24 | End: 2024-11-24

## 2024-11-24 RX ORDER — ONDANSETRON 2 MG/ML
4 INJECTION INTRAMUSCULAR; INTRAVENOUS ONCE
Status: COMPLETED | OUTPATIENT
Start: 2024-11-24 | End: 2024-11-24

## 2024-11-24 RX ORDER — DICYCLOMINE HCL 20 MG
20 TABLET ORAL 4 TIMES DAILY PRN
Qty: 28 TABLET | Refills: 0 | Status: SHIPPED | OUTPATIENT
Start: 2024-11-24 | End: 2024-12-01

## 2024-11-24 RX ORDER — ONDANSETRON 4 MG/1
4 TABLET, ORALLY DISINTEGRATING ORAL EVERY 4 HOURS PRN
Qty: 10 TABLET | Refills: 0 | Status: SHIPPED | OUTPATIENT
Start: 2024-11-24 | End: 2024-12-01

## 2024-11-24 RX ORDER — SODIUM CHLORIDE 9 MG/ML
1000 INJECTION, SOLUTION INTRAVENOUS ONCE
Status: COMPLETED | OUTPATIENT
Start: 2024-11-24 | End: 2024-11-24

## 2024-11-24 NOTE — ED INITIAL ASSESSMENT (HPI)
Nauseous since Thursday. Diarrhea since Friday. Possible blood in stool.   Pt. Awaiting to see MD Pagan via virtual monitor. Will provide discharge education at that time.

## 2024-11-24 NOTE — DISCHARGE INSTRUCTIONS
As discussed, no elevation of your white blood cell count, no anemia.  No kidney injury.  Electrolytes relatively within balance.  Slight decrease of potassium, however, you were given one-time dose of potassium replacement in immediate care clinic.     Urine without evidence of UTI, however, we will send for culture.  Small amount of ketones which is suggestive of dehydration.  You are given a liter of fluids to replace fluid volume loss secondary to diarrhea.  Recommend continue drink plenty water and electrolytes.    I would follow clear liquid diet for the next 24 to 48 hours.  Advance to bland diet.  After you are able to tolerate bland diet, you may resume your normal diet but be mindful to avoid any fatty, fried, spicy, salty, citrus, acidic foods and beverages.    I have prescribed Zofran which you can take as needed for nausea and Bentyl as needed for abdominal cramping.    If you have any worsening/localized abdominal pain with new onset vomiting, persistent diarrhea, fevers, please go to emergency room.

## 2024-11-24 NOTE — ED PROVIDER NOTES
Patient Seen in: [facility]      History     Chief Complaint   Patient presents with    Nausea/Vomiting/Diarrhea     Stated Complaint: Diarrhea - Nauseous since Thursday night diarrhea since Friday turned to watery*    Subjective: This is a 29-year-old female, past medical history of recurrent UTI, GERD, mitral valve prolapse, presents to immediate care for evaluation of generalized abdominal cramping prior to defecation, nausea, diarrhea.  Patient states she has been nauseous since Thursday.  The following day she began with frequent diarrhea that became more loose and watery on Saturday.  Patient concerned because she had slight blood/mucus in stool this morning.  She currently denies abdominal pain, states abdominal cramping is generalized and typically before episodes of diarrhea and then resolves.  She feels dehydrated/fatigued without any dizziness, lightheadedness, palpitations, shortness of breath.  No fever, chills, body aches.  No current viral symptoms, however, does report she has a child in  who is \"constantly bringing stuff home\".  Well-appearing.  Not ill or toxic appearing.  AOx4.  The history is provided by the patient.             Objective:     Past Medical History:    Cervical radiculopathy    Esophageal reflux    Mitral valve prolapse    Recurrent UTI    less often mow rthan in past              Past Surgical History:   Procedure Laterality Date    Egd      8 yrs at [facility] implant ear tubes      from mastoiditis    Myringotomy, laser-assisted                  Social History     Socioeconomic History    Marital status: Single   Tobacco Use    Smoking status: Never    Smokeless tobacco: Never   Vaping Use    Vaping status: Never Used   Substance and Sexual Activity    Alcohol use: Not Currently    Drug use: Never   Other Topics Concern    Caffeine Concern No     Comment: snapple tea 12 oz              Review of Systems   Constitutional:  Positive for fatigue. Negative  for activity change, appetite change, chills and fever.   HENT: Negative.     Respiratory: Negative.     Cardiovascular: Negative.    Gastrointestinal:  Positive for abdominal pain, blood in stool, diarrhea and nausea. Negative for constipation, rectal pain and vomiting.   Genitourinary: Negative.    Musculoskeletal: Negative.    Skin: Negative.    Neurological: Negative.  Negative for dizziness, weakness, light-headedness and headaches.       Positive for stated complaint: Diarrhea - Nauseous since Thursday night diarrhea since Friday turned to watery*  Other systems are as noted in HPI.  Constitutional and vital signs reviewed.      All other systems reviewed and negative except as noted above.    Physical Exam     ED Triage Vitals [11/24/24 1352]   /65   Pulse 101   Resp 18   Temp 97.8 °F (36.6 °C)   Temp src Oral   SpO2 99 %   O2 Device None (Room air)       Current Vitals:   Vital Signs  BP: 128/65  Pulse: 101  Resp: 18  Temp: 97.8 °F (36.6 °C)  Temp src: Oral    Oxygen Therapy  SpO2: 99 %  O2 Device: None (Room air)        Physical Exam  Constitutional:       General: She is not in acute distress.     Appearance: She is well-developed. She is not ill-appearing or toxic-appearing.   HENT:      Head: Normocephalic.      Mouth/Throat:      Mouth: Mucous membranes are moist.   Eyes:      Extraocular Movements: Extraocular movements intact.      Pupils: Pupils are equal, round, and reactive to light.   Cardiovascular:      Rate and Rhythm: Normal rate and regular rhythm.      Heart sounds: Normal heart sounds.   Pulmonary:      Effort: Pulmonary effort is normal.      Breath sounds: Normal breath sounds.   Abdominal:      General: Abdomen is flat. Bowel sounds are increased.      Palpations: Abdomen is soft.      Tenderness: There is generalized abdominal tenderness. There is no right CVA tenderness, left CVA tenderness, guarding or rebound. Negative signs include McBurney's sign.   Skin:     General: Skin is  warm.      Capillary Refill: Capillary refill takes less than 2 seconds.   Neurological:      General: No focal deficit present.      Mental Status: She is alert and oriented to person, place, and time.   Psychiatric:         Mood and Affect: Mood is anxious.             ED Course     Labs Reviewed   [facility] POCT URINALYSIS DIPSTICK - Abnormal; Notable for the following components:       Result Value    Urine Clarity Cloudy (*)     Protein urine Trace (*)     Ketone, Urine 40 (*)     Blood, Urine Small (*)     Leukocyte esterase urine Trace (*)     All other components within normal limits   POCT ISTAT CHEM8 CARTRIDGE - Abnormal; Notable for the following components:    ISTAT Potassium 3.4 (*)     All other components within normal limits   POCT PREGNANCY URINE - Normal   POCT CBC   URINE CULTURE, ROUTINE                   MDM      Differentials include: Acute cystitis with or without hematuria, gastroenteritis, diverticulitis.    Patient abdomen with generalized tenderness on exam.  No rebound or guarding.  No palpable organomegaly.  Hyperactive bowel sounds.  Patient states abdominal pain intermittent and mostly before diarrhea.  She is without fever or vomiting.    Urine obtained.  Positive presence of ketones, will give patient IV fluids via IV.  There is trace amount of leukocytes and small mount of blood.  Will send for culture given patient's history of recurrent UTI and in presence of diarrhea.  However, will not treat yet with antibiotics due to presence of diarrhea and abdominal cramping.  Patient verbalized understanding agrees with plan of care.    Patient CBC without leukocytosis.  Patient's chemistry with mild hypokalemia, 3.4.  Will replace with 40 mEq orally in immediate care clinic.    Patient was given IV fluids, IV Zofran, IV Pepcid.  Did wish to give patient Bentyl p.o., however, not available in [facility].    Patient unable to provide stool sample in immediate care, agrees to giving outpatient  sample.    Patient is aware of Zofran and Bentyl prescribed to pharmacy.  Strongly encourage patient follow clear liquid diet and advance as tolerated to bland diet.  She is aware of foods and liquids to avoid while she is still with vague abdominal cramping and diarrhea.    Education if she has any worsening abdominal pain, left lower quadrant Bi pain, new onset vomiting, fevers, persistent diarrhea with mucus and her blood to go to emergency room for further evaluation.  She verbalized understand agrees with plan of care.    Supervising physician, Dr. Ramirez -with workup, plan of care, strict ER precautions.        Medical Decision Making      Disposition and Plan     Clinical Impression:  1. Viral gastroenteritis    2. Diarrhea, unspecified type         Disposition:  There is no disposition on file for this visit.  There is no disposition time on file for this visit.    Follow-up:  Domenico Turcios MD  [address] [place] [id number]  [phone]      As needed          Medications Prescribed:  Current Discharge Medication List        START taking these medications    Details   ondansetron 4 MG Oral Tablet Dispersible Take 1 tablet (4 mg total) by mouth every 4 (four) hours as needed for Nausea.  Qty: 10 tablet, Refills: 0      dicyclomine 20 MG Oral Tab Take 1 tablet (20 mg total) by mouth 4 (four) times daily as needed.  Qty: 28 tablet, Refills: 0                 Supplementary Documentation:

## 2024-12-18 ENCOUNTER — OFFICE VISIT (OUTPATIENT)
Facility: CLINIC | Age: 29
End: 2024-12-18

## 2024-12-18 ENCOUNTER — TELEPHONE (OUTPATIENT)
Facility: CLINIC | Age: 29
End: 2024-12-18

## 2024-12-18 VITALS
SYSTOLIC BLOOD PRESSURE: 117 MMHG | HEART RATE: 83 BPM | HEIGHT: 64 IN | BODY MASS INDEX: 23.73 KG/M2 | WEIGHT: 139 LBS | DIASTOLIC BLOOD PRESSURE: 75 MMHG

## 2024-12-18 DIAGNOSIS — R10.13 DYSPEPSIA: ICD-10-CM

## 2024-12-18 DIAGNOSIS — K62.5 RECTAL BLEEDING: Primary | ICD-10-CM

## 2024-12-18 PROCEDURE — 3008F BODY MASS INDEX DOCD: CPT | Performed by: INTERNAL MEDICINE

## 2024-12-18 PROCEDURE — 3074F SYST BP LT 130 MM HG: CPT | Performed by: INTERNAL MEDICINE

## 2024-12-18 PROCEDURE — 99213 OFFICE O/P EST LOW 20 MIN: CPT | Performed by: INTERNAL MEDICINE

## 2024-12-18 PROCEDURE — 3078F DIAST BP <80 MM HG: CPT | Performed by: INTERNAL MEDICINE

## 2024-12-18 RX ORDER — FLUOXETINE 10 MG/1
5 CAPSULE ORAL DAILY
COMMUNITY

## 2024-12-18 RX ORDER — FOLIC ACID 1 MG/1
TABLET ORAL
COMMUNITY

## 2024-12-18 RX ORDER — FERROUS SULFATE 325(65) MG
325 TABLET, DELAYED RELEASE (ENTERIC COATED) ORAL
COMMUNITY

## 2024-12-18 NOTE — PATIENT INSTRUCTIONS
1.  May use Pepcid as needed.  You may also use pantoprazole, however, the latter medication tends to work best when taken on a regular basis.  2.  May use ondansetron (Zofran) as needed for the nausea.  3.  Advise either flexible sigmoidoscopy or colonoscopy to investigate the rectal bleeding.

## 2024-12-18 NOTE — TELEPHONE ENCOUNTER
Patient called to inform Dr. Sorenson that she will be running about 9 mins late for her appointment. Patient was made aware of the late policy.

## 2024-12-19 NOTE — PROGRESS NOTES
Subjective:   Patient ID: Camille Machuca is a 29 year old female.    HPI  Ej returns in follow-up.  She was last seen in February 2022.     As per previous notes Ej has a history of vomiting and acid reflux dating back to age 6-7 . She was treated with Reglan and Zantac.  Her symptoms improved but did not completely resolve.  She was \"weaned off of Reglan\" around age 9.  Interestingly during pregnancy 2 years prior she had no symptoms, however, her symptoms resumed about 6 months postpartum.  The patient stopped the Zantac a few years prior.     Previous work-up has included an upper endoscopy at age 13 at [place] which was reportedly negative.  A gastric emptying study revealed accelerated emptying.  Ej underwent a FIT test of the stool in November 2019 which was negative.  H. pylori antigen testing the stool in December 2019 was negative.    On previous visits Ej has been experiencing episodic postprandial nausea, stomach upset and alternating constipation and diarrhea believed to be due to IBS-mixed.  Following a sedated dental procedure she noted a flare in symptoms.  Symptomatic treatment was advised with pantoprazole and ondansetron.  She had also been started on sertraline.    Current history:  Ej is concerned about episodes of rectal bleeding.  A few times this year she has noted bright red blood on the toilet tissue and on occasion blood dripping into the toilet.  Her last episode was 1 month prior.    On November 24, 2024 Ej presented to urgent care for nausea, abdominal pain and diarrhea containing some mucus and blood.  The CBC was normal (WBC 5.4, Hgb: 14.6).  The patient was advised to submit stool studies for a stool culture and C. difficile, however, her symptoms abated and the tests were not submitted.    Ej is otherwise been well.  Her appetite fluctuates.  She is no longer taking pantoprazole on a daily basis.  If she notes a flare of nausea or heartburn she will take  on-demand famotidine (once weekly) or pantoprazole (once or twice monthly), the latter if the symptoms are \"really bad\".    Ej denies vomiting.  She has no dysphagia.  As mentioned she has occasional heartburn and acid brash.  She can experience intermittent either upper or lower abdominal pain.  She has loose stools once or twice weekly which is \"normal for me\".    Ej did not tolerate sertraline well.  She is now on fluoxetine.  She has tolerated this well and the dose was decreased from 10 mg daily to 5 mg daily.  She is avoiding the ondansetron out of concern of QT prolongation.     History/Other:   Review of Systems  See above    Wt Readings from Last 6 Encounters:   24 139 lb (63 kg)   09/15/23 140 lb (63.5 kg)   22 118 lb (53.5 kg)   22 118 lb 12.8 oz (53.9 kg)   22 119 lb (54 kg)   22 122 lb 3.2 oz (55.4 kg)       Current Outpatient Medications   Medication Sig Dispense Refill    FLUoxetine 10 MG Oral Cap Take 5 mg by mouth daily.      ferrous sulfate 325 (65 FE) MG Oral Tab EC Take 1 tablet (325 mg total) by mouth daily with breakfast.      Cholecalciferol (VITAMIN D3) 1.25 MG (52442 UT) Oral Cap Take by mouth.      famotidine 20 MG Oral Tab Take 1 tablet (20 mg total) by mouth daily as needed for Heartburn. 30 tablet 0    albuterol 108 (90 Base) MCG/ACT Inhalation Aero Soln Inhale 2 puffs into the lungs as needed.      pantoprazole 40 MG Oral Tab EC Take 1 tablet (40 mg total) by mouth 2 (two) times daily before meals. 60 tablet 3    cetirizine 10 MG Oral Tab Take 1 tablet (10 mg total) by mouth. 1 tab every other day      PREVIDENT 5000 PLUS 1.1 % Dental Cream       Ascorbic Acid (VITAMIN C) 100 MG Oral Tab Take 1 tablet (100 mg total) by mouth daily.      Multiple Vitamins-Minerals (ONE-A-DAY 50 PLUS OR) One A Day   1 daily  pre amy    vitamin      ibuprofen 600 MG Oral Tab Take 1 tablet (600 mg total) by mouth every 6 hours with food (Patient not taking: Reported on  12/18/2024) 20 tablet 0    sertraline 50 MG Oral Tab Take 1 tablet (50 mg total) by mouth daily. (Patient not taking: Reported on 12/18/2024) 30 tablet 1    sucralfate 1 g Oral Tab Take 1 tablet (1 g total) by mouth every 6 (six) hours as needed. (Patient not taking: Reported on 12/18/2024) 30 tablet 0    sertraline 25 MG Oral Tab Take 2 tablets (50 mg total) by mouth daily. (Patient not taking: Reported on 12/18/2024) 90 tablet 0    clonazePAM 0.25 MG Oral Tablet Dispersible Take 1 tablet (0.25 mg total) by mouth 2 (two) times daily as needed (anxiety, panic). (Patient not taking: Reported on 12/18/2024) 10 tablet 0    FLOVENT  MCG/ACT Inhalation Aerosol Inhale 2 puffs into the lungs 2 (two) times a day. (Patient not taking: Reported on 12/18/2024)      MOMETASONE FUROATE 50 MCG/ACT Nasal Suspension SPRAY 2 SPRAYS INTO EACH NOSTRIL EVERY DAY (Patient not taking: Reported on 12/18/2024) 1 Bottle 0     Allergies:Allergies[1]    Objective:   Physical Exam  Vitals and nursing note reviewed.   Constitutional:       General: She is not in acute distress.     Appearance: She is well-developed. She is not ill-appearing, toxic-appearing or diaphoretic.   HENT:      Head: Normocephalic and atraumatic.      Mouth/Throat:      Pharynx: No oropharyngeal exudate.   Eyes:      General: No scleral icterus.     Conjunctiva/sclera: Conjunctivae normal.   Neck:      Thyroid: No thyromegaly.   Cardiovascular:      Rate and Rhythm: Normal rate and regular rhythm.      Heart sounds: Normal heart sounds.   Pulmonary:      Effort: Pulmonary effort is normal. No respiratory distress.      Breath sounds: Normal breath sounds. No wheezing or rales.   Abdominal:      General: Bowel sounds are normal. There is no distension.      Palpations: Abdomen is soft. There is no mass.      Tenderness: There is no abdominal tenderness. There is no guarding or rebound.   Genitourinary:     Comments: Deferred to the time of endoscopic  evaluation  Musculoskeletal:      Cervical back: Neck supple.   Lymphadenopathy:      Cervical: No cervical adenopathy.   Neurological:      Mental Status: She is alert and oriented to person, place, and time.   Psychiatric:         Behavior: Behavior normal.       Component  Ref Range & Units 10/15/24  2:49 PM   Iron  40 - 170 µg/dL 153   Transferrin  200 - 360 mg/dL 270   Ferritin  8.0 - 252.0 ng/mL 12.8   TIBC  250 - 450 µg/dL 378   Iron Saturation  20 - 55 % 40   Resulting Agency [facility]     Specimen Collected: 10/15/24  2:49 PM    Performed by: [facility] Last Resulted: 10/16/24  7:54 AM   Received From: [facility]  Result Received: 11/24/24  1:47 PM         Component      Latest Ref Rng 11/24/2024   WBC IC      4.0 - 11.0 x10ˆ3/uL 5.4    RBC IC      3.80 - 5.30 X10ˆ6/uL 5.10    HGB IC      12.0 - 16.0 g/dL 14.6    HCT IC      35.0 - 48.0 % 44.5    MCV IC      80.0 - 100.0 fL 87.3    MCH IC      26.0 - 34.0 pg 28.6    MCHC IC      31.0 - 37.0 g/dL 32.8    PLT IC      150.0 - 450.0 X10ˆ3/uL 198.0    # Neutrophil      1.5 - 7.7 X10ˆ3/uL 3.0    # Lymphocyte      1.0 - 4.0 X10ˆ3/uL 1.6    #MXD IC      0.1 - 1.0 X10ˆ3/uL 0.8    Neutrophils %      % 57.2    Lymphocytes %      % 28.8    Mixed Cell %      % 14.0          Assessment & Plan:   1. Rectal bleeding  Ej presents with intermittent episodes of rectal bleeding.  I suspect that the bleeding is due to symptomatic hemorrhoids.  We have discussed other possibilities including mucosal inflammation or less likely polyp or neoplasm.  I am recommending endoscopic evaluation.  We have discussed the option of a colonoscopy versus flexible sigmoidoscopy including the nature of each procedure, limitations of each procedure and risks of each procedure.  We also discussed a sedated versus nonsedated procedure.  The patient prefers sedation and is leaning towards a flexible sigmoidoscopy.  She will contemplate the above and contact us to schedule.  We  again discussed that I cannot exclude other structural lesions without endoscopic evaluation.    2. Dyspepsia  Likely due to nonulcer dyspepsia.  The patient may continue on-demand famotidine.  We have discussed that medication such as pantoprazole are more efficacious if taken consistently.  The patient can take occasional doses of ondansetron with the low-dose fluoxetine.  This would be unlikely to result in clinically significant QT prolongation.          Meds This Visit:  Requested Prescriptions      No prescriptions requested or ordered in this encounter       Imaging & Referrals:  None         [1]   Allergies  Allergen Reactions    Azithromycin HIVES    Ciprofloxacin HIVES    Clindamycin/Lincomycin HIVES and RASH    Demerol HIVES    Metronidazole HIVES    Milk Protein Extract HIVES    Other HIVES     letteuce    Pineapple SWELLING    Rice HIVES    Strawberries HIVES    Sulfamethoxazole W/Trimethoprim HIVES    Dander OTHER (SEE COMMENTS)     Stuffy nose (cat and Dogs)    Dust Mite Extract Runny nose    Trichophyton Coughing

## 2025-01-02 ENCOUNTER — APPOINTMENT (OUTPATIENT)
Dept: GENERAL RADIOLOGY | Facility: HOSPITAL | Age: 30
End: 2025-01-02
Payer: COMMERCIAL

## 2025-01-02 ENCOUNTER — HOSPITAL ENCOUNTER (EMERGENCY)
Facility: HOSPITAL | Age: 30
Discharge: HOME | End: 2025-01-02
Attending: EMERGENCY MEDICINE
Payer: COMMERCIAL

## 2025-01-02 ENCOUNTER — HOSPITAL ENCOUNTER (OUTPATIENT)
Age: 30
Discharge: ANOTHER HOSPITAL | End: 2025-01-02
Payer: COMMERCIAL

## 2025-01-02 VITALS
HEART RATE: 95 BPM | SYSTOLIC BLOOD PRESSURE: 142 MMHG | OXYGEN SATURATION: 98 % | DIASTOLIC BLOOD PRESSURE: 76 MMHG | TEMPERATURE: 99 F | RESPIRATION RATE: 24 BRPM

## 2025-01-02 VITALS
HEART RATE: 72 BPM | RESPIRATION RATE: 17 BRPM | DIASTOLIC BLOOD PRESSURE: 80 MMHG | WEIGHT: 144 LBS | TEMPERATURE: 99 F | HEIGHT: 64 IN | OXYGEN SATURATION: 100 % | SYSTOLIC BLOOD PRESSURE: 106 MMHG | BODY MASS INDEX: 24.59 KG/M2

## 2025-01-02 DIAGNOSIS — B34.9 VIRAL SYNDROME: ICD-10-CM

## 2025-01-02 DIAGNOSIS — R07.9 CHEST PAIN OF UNCERTAIN ETIOLOGY: Primary | ICD-10-CM

## 2025-01-02 LAB
ALBUMIN SERPL-MCNC: 4.8 G/DL (ref 3.2–4.8)
ALBUMIN/GLOB SERPL: 1.8 {RATIO} (ref 1–2)
ALP LIVER SERPL-CCNC: 58 U/L
ALT SERPL-CCNC: 31 U/L
ANION GAP SERPL CALC-SCNC: 10 MMOL/L (ref 0–18)
AST SERPL-CCNC: 43 U/L (ref ?–34)
B-HCG UR QL: NEGATIVE
BASOPHILS # BLD AUTO: 0.03 X10(3) UL (ref 0–0.2)
BASOPHILS NFR BLD AUTO: 0.8 %
BILIRUB SERPL-MCNC: 0.3 MG/DL (ref 0.3–1.2)
BUN BLD-MCNC: 6 MG/DL (ref 9–23)
BUN/CREAT SERPL: 8.6 (ref 10–20)
CALCIUM BLD-MCNC: 9.5 MG/DL (ref 8.7–10.4)
CHLORIDE SERPL-SCNC: 105 MMOL/L (ref 98–112)
CO2 SERPL-SCNC: 25 MMOL/L (ref 21–32)
CREAT BLD-MCNC: 0.7 MG/DL
DEPRECATED RDW RBC AUTO: 43.5 FL (ref 35.1–46.3)
EGFRCR SERPLBLD CKD-EPI 2021: 119 ML/MIN/1.73M2 (ref 60–?)
EOSINOPHIL # BLD AUTO: 0.01 X10(3) UL (ref 0–0.7)
EOSINOPHIL NFR BLD AUTO: 0.3 %
ERYTHROCYTE [DISTWIDTH] IN BLOOD BY AUTOMATED COUNT: 13.2 % (ref 11–15)
GLOBULIN PLAS-MCNC: 2.7 G/DL (ref 2–3.5)
GLUCOSE BLD-MCNC: 86 MG/DL (ref 70–99)
HCT VFR BLD AUTO: 44.6 %
HGB BLD-MCNC: 14.7 G/DL
IMM GRANULOCYTES # BLD AUTO: 0.03 X10(3) UL (ref 0–1)
IMM GRANULOCYTES NFR BLD: 0.8 %
LYMPHOCYTES # BLD AUTO: 1.42 X10(3) UL (ref 1–4)
LYMPHOCYTES NFR BLD AUTO: 37.5 %
MCH RBC QN AUTO: 29.3 PG (ref 26–34)
MCHC RBC AUTO-ENTMCNC: 33 G/DL (ref 31–37)
MCV RBC AUTO: 89 FL
MONOCYTES # BLD AUTO: 0.68 X10(3) UL (ref 0.1–1)
MONOCYTES NFR BLD AUTO: 17.9 %
NEUTROPHILS # BLD AUTO: 1.62 X10 (3) UL (ref 1.5–7.7)
NEUTROPHILS # BLD AUTO: 1.62 X10(3) UL (ref 1.5–7.7)
NEUTROPHILS NFR BLD AUTO: 42.7 %
OSMOLALITY SERPL CALC.SUM OF ELEC: 287 MOSM/KG (ref 275–295)
PLATELET # BLD AUTO: 185 10(3)UL (ref 150–450)
POTASSIUM SERPL-SCNC: 3.5 MMOL/L (ref 3.5–5.1)
PROT SERPL-MCNC: 7.5 G/DL (ref 5.7–8.2)
RBC # BLD AUTO: 5.01 X10(6)UL
SODIUM SERPL-SCNC: 140 MMOL/L (ref 136–145)
TROPONIN I SERPL HS-MCNC: <3 NG/L
WBC # BLD AUTO: 3.8 X10(3) UL (ref 4–11)

## 2025-01-02 PROCEDURE — 93005 ELECTROCARDIOGRAM TRACING: CPT

## 2025-01-02 PROCEDURE — 99285 EMERGENCY DEPT VISIT HI MDM: CPT

## 2025-01-02 PROCEDURE — 81025 URINE PREGNANCY TEST: CPT

## 2025-01-02 PROCEDURE — 99284 EMERGENCY DEPT VISIT MOD MDM: CPT

## 2025-01-02 PROCEDURE — 0241U SARS-COV-2/FLU A AND B/RSV BY PCR (GENEXPERT): CPT | Performed by: EMERGENCY MEDICINE

## 2025-01-02 PROCEDURE — 80053 COMPREHEN METABOLIC PANEL: CPT | Performed by: EMERGENCY MEDICINE

## 2025-01-02 PROCEDURE — 93010 ELECTROCARDIOGRAM REPORT: CPT

## 2025-01-02 PROCEDURE — 85025 COMPLETE CBC W/AUTO DIFF WBC: CPT | Performed by: EMERGENCY MEDICINE

## 2025-01-02 PROCEDURE — 36415 COLL VENOUS BLD VENIPUNCTURE: CPT

## 2025-01-02 PROCEDURE — 71045 X-RAY EXAM CHEST 1 VIEW: CPT | Performed by: EMERGENCY MEDICINE

## 2025-01-02 PROCEDURE — 84484 ASSAY OF TROPONIN QUANT: CPT | Performed by: EMERGENCY MEDICINE

## 2025-01-03 LAB
ATRIAL RATE: 79 BPM
ATRIAL RATE: 81 BPM
FLUAV + FLUBV RNA SPEC NAA+PROBE: NEGATIVE
FLUAV + FLUBV RNA SPEC NAA+PROBE: POSITIVE
P AXIS: 65 DEGREES
P AXIS: 73 DEGREES
P-R INTERVAL: 150 MS
P-R INTERVAL: 170 MS
Q-T INTERVAL: 370 MS
Q-T INTERVAL: 374 MS
QRS DURATION: 78 MS
QRS DURATION: 82 MS
QTC CALCULATION (BEZET): 428 MS
QTC CALCULATION (BEZET): 429 MS
R AXIS: 60 DEGREES
R AXIS: 66 DEGREES
RSV RNA SPEC NAA+PROBE: NEGATIVE
SARS-COV-2 RNA RESP QL NAA+PROBE: NOT DETECTED
T AXIS: -8 DEGREES
T AXIS: 2 DEGREES
VENTRICULAR RATE: 79 BPM
VENTRICULAR RATE: 81 BPM

## 2025-01-03 NOTE — ED PROVIDER NOTES
Patient Seen in: [facility] Emergency Department    History     Chief Complaint   Patient presents with    Chest Pain Angina       HPI    29-year-old female with recent flulike symptoms such as 4 days of rhinorrhea, cough, no significant dyspnea.  She does report a mild amount of pleuritic substernal chest pain that is very brief and intermittent and nonexertional.    Denies any recent immobilization, surgery, unilateral lower extremity edema, known cancer history, or recent travel. No hx of DVT or PE.       History reviewed.   Past Medical History:    Asthma (HCC)    Cervical radiculopathy    Esophageal reflux    Mitral valve prolapse    Recurrent UTI    less often mow rthan in past       History reviewed.   Past Surgical History:   Procedure Laterality Date    Egd      8 yrs at [facility] implant ear tubes      from mastoiditis    Myringotomy, laser-assisted           Medications :  Prescriptions Prior to Admission[1]     Family History   Problem Relation Age of Onset    Hypertension Father     Hypertension Mother     Diabetes Maternal Grandmother         CHF    Diabetes Maternal Grandfather     Diabetes Paternal Grandmother     Diabetes Paternal Grandfather     No Known Problems Sister     No Known Problems Brother        Smoking Status:   Social History     Socioeconomic History    Marital status: Single   Tobacco Use    Smoking status: Never    Smokeless tobacco: Never   Vaping Use    Vaping status: Never Used   Substance and Sexual Activity    Alcohol use: Not Currently    Drug use: Never   Other Topics Concern    Caffeine Concern No     Comment: snapple tea 12 oz       Constitutional and vital signs reviewed.      Social History and Family History elements reviewed from today, pertinent positives to the presenting problem noted.    Physical Exam     ED Triage Vitals [01/02/25 1952]   /81   Pulse 94   Resp 18   Temp 99 °F (37.2 °C)   Temp src Oral   SpO2 99 %   O2 Device None (Room air)        All measures to prevent infection transmission during my interaction with the patient were taken. The patient was already wearing a droplet mask on my arrival to the room. Personal protective equipment was worn throughout the duration of the exam.  Handwashing was performed prior to and after the exam.  Stethoscope and any equipment used during my examination was cleaned with super sani-cloth germicidal wipes following the exam.     Physical Exam    General: NAD  Head: Normocephalic and atraumatic.  Mouth/Throat/Ears/Nose: Oropharynx is clear and moist.   Eyes: Conjunctivae and EOM are normal.   Neck: Normal range of motion. Supple.   Cardiovascular: Normal rate, regular rhythm, normal heart sounds.  Respiratory/Chest: Clear and equal bilaterally. Exhibits no tenderness.  Gastrointestinal: Soft, non-tender, non-distended. Bowel sounds are normal.   Musculoskeletal:No swelling or deformity.   Neurological: Alert and appropriate. No focal deficits.   Skin: Skin is warm and dry. No pallor.  Psychiatric: Has a normal mood and affect.      ED Course        Labs Reviewed   COMP METABOLIC PANEL (14) - Abnormal; Notable for the following components:       Result Value    BUN 6 (*)     BUN/CREA Ratio 8.6 (*)     AST 43 (*)     All other components within normal limits   CBC WITH DIFFERENTIAL WITH PLATELET - Abnormal; Notable for the following components:    WBC 3.8 (*)     All other components within normal limits   SARS-COV-2/FLU A AND B/RSV BY PCR (GENEXPERT) - Abnormal; Notable for the following components:    Influenza A by PCR Positive (*)     All other components within normal limits    Narrative:     This test is intended for the qualitative detection and differentiation of SARS-CoV-2, influenza A, influenza B, and respiratory syncytial virus (RSV) viral RNA in nasopharyngeal or nares swabs from individuals suspected of respiratory viral infection consistent with COVID-19 by their healthcare provider. Signs and  symptoms of respiratory viral infection due to SARS-CoV-2, influenza, and RSV can be similar.                                    Test performed using the Xpert Xpress SARS-CoV-2/FLU/RSV (real time RT-PCR)  assay on the [employer] instrument, [employer], [place] [address].                   This test is being used under the Food and Drug Administration's Emergency Use Authorization.                                    The authorized Fact Sheet for Healthcare Providers for this assay is available upon request from the laboratory.   TROPONIN I HIGH SENSITIVITY - Normal   POCT PREGNANCY URINE - Normal   RAINBOW DRAW BLUE     EKG    Rate, intervals and axes as noted on EKG Report.  Rate: 79  Rhythm: Sinus Rhythm  Reading: No STEMI.  This is my interpretation.           As Interpreted by me    Imaging Results Available and Reviewed while in ED: No results found.  ED Medications Administered: Medications - No data to display      MDM     Vitals:    01/02/25 1952 01/02/25 2145 01/02/25 2300 01/02/25 2330   BP: 115/81 (!) 121/93 113/81 106/80   Pulse: 94 92 78 72   Resp: 18 24 21 17   Temp: 99 °F (37.2 °C)      TempSrc: Oral      SpO2: 99% 99% 98% 100%   Weight: 65.3 kg      Height: 162.6 cm (5' 4\")        *I personally reviewed and interpreted all ED vitals.    Pulse Ox: 98%, Room air, Normal     Monitor Interpretation:   normal sinus rhythm as interpreted by me.  The cardiac monitor was ordered given chest pain.      Medical Decision Making      Differential Diagnosis/ Diagnostic Considerations: Influenza, COVID-19, ACS    Complicating Factors: The patient already has does not have any pertinent problems on file. to contribute to the complexity of this ED evaluation.    I reviewed prior chart records including urgent care visit note prompting referral to the emergency department.  Patient here with chest pain likely related to pleurisy and flulike illness, her lab work is notable for positive influenza.  She is not within the  window for oseltamavir.  Chest x-ray unremarkable for focal infiltrate to suggest pneumonia on my interpretation.  PERC negative, inconsistent with PE.    Dc In stable condition.  Patient is comfortable with the plan.  Prescriptions: Tylenol    Disposition and Plan     Clinical Impression:  1. Chest pain of uncertain etiology    2. Viral syndrome        Disposition:  Discharge    Follow-up:  Domenico Turcios MD  [address] [address]  [phone]    Schedule an appointment as soon as possible for a visit in 1 day(s)        Medications Prescribed:  Discharge Medication List as of 1/2/2025 11:37 PM                           [1] (Not in a hospital admission)

## 2025-01-03 NOTE — ED PROVIDER NOTES
Chief Complaint   Patient presents with    Chest Pain     Have been sick for a few days and having chest pains since yesterday - Entered by patient       HPI:     Camille Machuca is a 29 year old female who presents for evaluation of left-sided chest pain rating into the left upper back over the last day, notes preceding fever over the last day with mild cough with fever resolving today, afebrile on arrival.  Denies self or family history of ACS or hypercoagulable state, notes previous history of mitral valve prolapse without further monitoring or follow-up cardiology in the last 4 years.  Denies oral birth control use or recent travel history.  Notes that at home coronavirus and influenza screen both negative.  Denies recent illness antibiotic or vaccine.  Notes pain consistent along the left chest with some slight heaviness along the left upper arm over the last few hours, 4 out of 10.  Denies associated dizziness blurred vision headache sore throat dysphagia neck pain abdominal pain vomiting or diarrhea.      PFSH    PFSH asessment screens reviewed and agree.  Nurses notes reviewed I agree with documentation.    Family History   Problem Relation Age of Onset    Hypertension Father     Hypertension Mother     Diabetes Maternal Grandmother         CHF    Diabetes Maternal Grandfather     Diabetes Paternal Grandmother     Diabetes Paternal Grandfather     No Known Problems Sister     No Known Problems Brother      Family history reviewed with patient/caregiver and is not pertinent to presenting problem.  Social History     Socioeconomic History    Marital status: Single     Spouse name: Not on file    Number of children: Not on file    Years of education: Not on file    Highest education level: Not on file   Occupational History    Not on file   Tobacco Use    Smoking status: Never    Smokeless tobacco: Never   Vaping Use    Vaping status: Never Used   Substance and Sexual Activity    Alcohol use: Not Currently     Drug use: Never    Sexual activity: Not on file   Other Topics Concern    Caffeine Concern No     Comment: snapple tea 12 oz    Exercise Not Asked    Seat Belt Not Asked    Special Diet Not Asked    Stress Concern Not Asked    Weight Concern Not Asked   Social History Narrative    Not on file     Social Drivers of Health     Financial Resource Strain: Not on file   Food Insecurity: Not on file   Transportation Needs: Not on file   Physical Activity: Not on file   Stress: Not on file   Social Connections: Not on file   Housing Stability: Not on file         ROS:   Positive for stated complaint: Chest pain  All other systems reviewed and negative except as noted above.  Constitutional and Vital Signs Reviewed.      Physical Exam:     Findings:    /76   Pulse 95   Temp 98.5 °F (36.9 °C) (Oral)   Resp 24   LMP 12/05/2024 (Exact Date)   SpO2 98%   GENERAL: well developed, well nourished, well hydrated, no distress  SKIN: good skin turgor, no obvious rashes  NECK: supple, no adenopathy  CARDIO: No chest wall tenderness.  RRR without murmur  EXTREMITIES: no cyanosis or edema. BLEVINS without difficulty  GI: soft, non-tender, normal bowel sounds  HEAD: normocephalic, atraumatic  EYES: sclera non icteric bilateral, conjunctiva clear  EARS: TMs clear bilaterally. Canals clear.  NOSE: Scant rhinorrhea.  MMM.  Nasal turbinates: pink, normal mucosa  THROAT: clear, without exudates, uvula midline, and airway patent  LUNGS: clear to auscultation bilaterally; no rales, rhonchi, or wheezes  NEURO: No focal deficits  PSYCH: Alert and oriented x3.  Answering questions appropriately.  Mood appropriate.    MDM/Assessment/Plan:   Orders for this encounter:    Orders Placed This Encounter    EKG 12 Lead     Order Specific Question:   Release to patient     Answer:   Immediate       Labs performed this visit:  Recent Results (from the past 10 hours)   EKG 12 Lead    Collection Time: 01/02/25  6:41 PM   Result Value Ref Range     Ventricular rate 81 BPM    Atrial rate 81 BPM    P-R Interval 170 ms    QRS Duration 82 ms    Q-T Interval 370 ms    QTC Calculation (Bezet) 429 ms    P Axis 65 degrees    R Axis 66 degrees    T Axis 2 degrees       MDM:  EKG 81 bpm normal sinus rhythm T wave inversions in septal leads V3 V4.  No gross ST elevation or depression QT of 370.    Patient instructed on EKG findings and history agreeable to high-level care through [facility] based on recommendations in collaboration with supervising Dr. Edwards.  Requesting private vehicle,    Diagnosis:    ICD-10-CM    1. Chest pain of uncertain etiology  R07.9           All results reviewed and discussed with patient.  See AVS for detailed discharge instructions for your condition today.    Follow Up with:  No follow-up provider specified.

## 2025-01-03 NOTE — ED INITIAL ASSESSMENT (HPI)
Pt presents to ed with c/o chest pain. Pt states she had chest pain last night that has not improved. Reports the pain is under her left breast and goes to her back. Sent to ed from  for further evaluation.     Tylenol at 3am .    Denies SOB.

## 2025-06-03 ENCOUNTER — OFFICE VISIT (OUTPATIENT)
Age: 30
End: 2025-06-03
Payer: COMMERCIAL

## 2025-06-03 VITALS
HEIGHT: 64 IN | HEART RATE: 77 BPM | OXYGEN SATURATION: 97 % | WEIGHT: 140 LBS | DIASTOLIC BLOOD PRESSURE: 78 MMHG | BODY MASS INDEX: 23.9 KG/M2 | SYSTOLIC BLOOD PRESSURE: 102 MMHG

## 2025-06-03 DIAGNOSIS — R07.1 CHEST PAIN ON BREATHING: Primary | ICD-10-CM

## 2025-06-03 DIAGNOSIS — M54.12 CERVICAL RADICULOPATHY: ICD-10-CM

## 2025-06-03 DIAGNOSIS — R00.2 PALPITATIONS: ICD-10-CM

## 2025-06-03 DIAGNOSIS — F41.9 ANXIETY: ICD-10-CM

## 2025-06-03 DIAGNOSIS — I34.0 MITRAL VALVE INSUFFICIENCY, UNSPECIFIED ETIOLOGY: ICD-10-CM

## 2025-06-03 DIAGNOSIS — H93.A2 TINNITUS OF LEFT EAR OF VASCULAR ORIGIN: ICD-10-CM

## 2025-06-03 PROCEDURE — 99204 OFFICE O/P NEW MOD 45 MIN: CPT | Performed by: INTERNAL MEDICINE

## 2025-06-03 PROCEDURE — 3074F SYST BP LT 130 MM HG: CPT | Performed by: INTERNAL MEDICINE

## 2025-06-03 PROCEDURE — 3008F BODY MASS INDEX DOCD: CPT | Performed by: INTERNAL MEDICINE

## 2025-06-03 PROCEDURE — 3078F DIAST BP <80 MM HG: CPT | Performed by: INTERNAL MEDICINE

## 2025-06-03 NOTE — PROGRESS NOTES
The following individual(s) verbally consented to be recorded using ambient AI listening technology and understand that they can each withdraw their consent to this listening technology at any point by asking the clinician to turn off or pause the recording: YES    Patient name: Camille Machuca  Additional names:

## 2025-06-03 NOTE — PROGRESS NOTES
History of Present Illness  Camille Machuca is a 30 year old female with mitral valve prolapse who presents with chest pain and anxiety management.    She experiences intermittent pleuritic chest pain over the past few months, located below the left breast and radiating to her back, occurring with deep breaths. The pain is not constant and was last experienced three weeks ago. These episodes are associated with recent illnesses, including influenza, but she has tested negative for COVID-19 and influenza recently. She has a history of mitral valve prolapse diagnosed in her late teens, with the last cardiology evaluation approximately five years ago.    She experiences frequent palpitations, occurring about once a week or every two weeks, lasting less than a minute. She has not been on any medication for her mitral valve prolapse and has not seen a cardiologist since her daughter was born five years ago. During her last pregnancy, there was consideration for beta-blocker therapy, but it was deemed unnecessary.    She has been dealing with anxiety for the past two to three years, initially triggered by a dental procedure. She was started on Zoloft, which worsened her symptoms, and was subsequently switched to Prozac. She currently takes 10 mg of Prozac daily, occasionally adjusting to 5 mg based on her symptoms. She also sees a therapist for her anxiety and panic attacks, which are manageable and do not involve a fear of dying.    She reports a history of cervical radiculopathy, which flares up occasionally, causing pain down her arm into her fingers. Physical therapy has been effective in managing these symptoms, and she has used prednisone during flare-ups, although she finds physical therapy more beneficial.    She has a history of asthma, which only flares up during illness or prolonged physical activity. She avoids using her inhaler due to side effects. She also reports chronic fatigue despite adequate  sleep, which she attributes to low ferritin and vitamin D levels, as noted in past blood work. She has received iron infusions in the past and takes vitamin D supplements.    She experiences pulsatile tinnitus in her left ear, described as a thumping noise that has been persistent for the past two months. This symptom is new and has been particularly bothersome recently.       HISTORY:  Past Medical History[1]   Past Surgical History[2]   Family History[3]   Short Social Hx on File[4]           Patient Active Problem List    Diagnosis Date Noted    Palpitations 08/09/2021    Fever 08/09/2021    Cervical radiculopathy 08/04/2020    Gastroesophageal reflux disease 08/04/2020    Abnormal sensation of leg 08/04/2020    Mild intermittent asthma without complication (HCC) 03/23/2020    Mitral valve insufficiency 07/02/2019    Hypocalcemia 07/02/2019    Pulmonary valve insufficiency 07/02/2019        Medications - Current[5]     Vitals:    06/03/25 1342   BP: 102/78   Pulse: 77   VITALSBody mass index is 24.03 kg/m².      General: No distress - looks healthy     Seems anxious      No  thyromegly       Lungs: normal     Heart: Regular sounding - no murmur heard     Abd  nl     Recent labs  looked good  cbc  chem  thyroid      Chart  reviewed  last  echo 2019   no  prolapse seen  min  MR Obrien was seen today for physical.    Diagnoses and all orders for this visit:    Chest pain on breathing  -     Cancel: CBC With Differential With Platelet; Future  -     Cancel: Comp Metabolic Panel (14); Future    Cervical radiculopathy    Palpitations  -     Cancel: TSH W Reflex To Free T4; Future  -     Cancel: Comp Metabolic Panel (14); Future    Mitral valve insufficiency, unspecified etiology  -     CARD ECHO 2D DOPPLER (CPT=93306); Future    Anxiety  -     Cancel: TSH W Reflex To Free T4; Future  -     Cancel: Comp Metabolic Panel (14); Future    Tinnitus of left ear of vascular origin  -     MRA BRAIN (CPT=70544);  Future  -     NEUROSURGERY - INTERNAL; Future       Assessment & Plan  Chest Pain  Intermittent pleuritic chest pain likely due to pleurisy or anxiety, I think it is unlikely this is  ischemic heart disease or significant mitral regurgitation.  I have low suspicion for pulmonary embolism  - Order echocardiogram to assess mitral valve status and rule out significant mitral regurgitation.    Mitral Valve Prolapse diagnosed in the past-last echo did not show any prolapse did show   minimal regurgitation. Recent symptoms unlikely related to significant regurgitation.  Physical exam did not show significant murmur  - Order echocardiogram to reassess mitral valve status.    Pulsatile Tinnitus  Recent pulsatile tinnitus possibly due to vascular causes.  - Order MRA of the brain to evaluate for vascular abnormalities.  - Refer to neurosurgeon if MRA is denied by insurance or if abnormalities are found.    Anxiety Disorder  Anxiety managed with Prozac 10 mg. Discussed SSRI safety during pregnancy.  She has had at least 2 doctors tell her to stay on her current dose I told her it was difficult for me to assess need for this based on our first meeting  - Continue Prozac 10 mg daily.  - Discuss with therapist regarding potential future weaning off medication.    Cervical Radiculopathy  Cervical radiculopathy managed with physical therapy, better relief than prednisone.  - Recommend physical therapy as needed for flare-ups.    Asthma  Asthma with infrequent flare-ups, avoids inhaler due to side effects.    General Health Maintenance  Normal labs except slightly low ferritin and vitamin D. Family history of cardiovascular and metabolic conditions noted.    Follow-up    - Follow up with therapist regarding anxiety management and potential medication weaning.  - Consider follow-up with cardiologist after echocardiogram results shows any abnormalities             This note was prepared using Dragon Medical voice recognition  dictation software and as a result, errors may occur. When identified, these errors have been corrected. While every attempt is made to correct errors during dictation, discrepancies may still exist            [1]   Past Medical History:   Asthma (HCC)    Cervical radiculopathy    Esophageal reflux    Mitral valve prolapse    Recurrent UTI    less often mow rthan in past   [2]   Past Surgical History:  Procedure Laterality Date    Egd      8 yrs at [facility] implant ear tubes      from mastoiditis    Myringotomy, laser-assisted     [3]   Family History  Problem Relation Age of Onset    Hypertension Father     Hypertension Mother     Diabetes Maternal Grandmother         CHF    Diabetes Maternal Grandfather     Diabetes Paternal Grandmother     Diabetes Paternal Grandfather     No Known Problems Sister     No Known Problems Brother    [4]   Social History  Socioeconomic History    Marital status: Single   Tobacco Use    Smoking status: Never    Smokeless tobacco: Never   Vaping Use    Vaping status: Never Used   Substance and Sexual Activity    Alcohol use: Not Currently    Drug use: Never   Other Topics Concern    Caffeine Concern No     Comment: snapple tea 12 oz   [5]   Current Outpatient Medications:     FLUoxetine 10 MG Oral Cap, Take 5 mg by mouth daily., Disp: , Rfl:     Cholecalciferol (VITAMIN D3) 1.25 MG (92895 UT) Oral Cap, Take by mouth., Disp: , Rfl:     famotidine 20 MG Oral Tab, Take 1 tablet (20 mg total) by mouth daily as needed for Heartburn., Disp: 30 tablet, Rfl: 0    albuterol 108 (90 Base) MCG/ACT Inhalation Aero Soln, Inhale 2 puffs into the lungs as needed., Disp: , Rfl:     FLOVENT  MCG/ACT Inhalation Aerosol, Inhale 2 puffs into the lungs 2 (two) times a day., Disp: , Rfl:     pantoprazole 40 MG Oral Tab EC, Take 1 tablet (40 mg total) by mouth 2 (two) times daily before meals., Disp: 60 tablet, Rfl: 3    cetirizine 10 MG Oral Tab, Take 1 tablet (10 mg total) by mouth. 1 tab every  other day, Disp: , Rfl:     MOMETASONE FUROATE 50 MCG/ACT Nasal Suspension, SPRAY 2 SPRAYS INTO EACH NOSTRIL EVERY DAY, Disp: 1 Bottle, Rfl: 0    Multiple Vitamins-Minerals (ONE-A-DAY 50 PLUS OR), One A Day  1 daily  pre     vitamin, Disp: , Rfl:     ferrous sulfate 325 (65 FE) MG Oral Tab EC, Take 1 tablet (325 mg total) by mouth daily with breakfast., Disp: , Rfl:     ibuprofen 600 MG Oral Tab, Take 1 tablet (600 mg total) by mouth every 6 hours with food (Patient not taking: Reported on 2024), Disp: 20 tablet, Rfl: 0    sertraline 50 MG Oral Tab, Take 1 tablet (50 mg total) by mouth daily. (Patient not taking: Reported on 2024), Disp: 30 tablet, Rfl: 1    sucralfate 1 g Oral Tab, Take 1 tablet (1 g total) by mouth every 6 (six) hours as needed. (Patient not taking: Reported on 2024), Disp: 30 tablet, Rfl: 0    sertraline 25 MG Oral Tab, Take 2 tablets (50 mg total) by mouth daily. (Patient not taking: Reported on 2024), Disp: 90 tablet, Rfl: 0    clonazePAM 0.25 MG Oral Tablet Dispersible, Take 1 tablet (0.25 mg total) by mouth 2 (two) times daily as needed (anxiety, panic). (Patient not taking: Reported on 2024), Disp: 10 tablet, Rfl: 0    PREVIDENT 5000 PLUS 1.1 % Dental Cream, , Disp: , Rfl:     Ascorbic Acid (VITAMIN C) 100 MG Oral Tab, Take 1 tablet (100 mg total) by mouth daily., Disp: , Rfl:

## 2026-08-18 ENCOUNTER — APPOINTMENT (OUTPATIENT)
Dept: INTERNAL MEDICINE | Age: 31
End: 2026-08-18

## (undated) NOTE — LETTER
Date & Time: 1/2/2025, 11:44 PM  Patient: Camille Machuca  Encounter Provider(s):    Sabina Freire MD       To Whom It May Concern:    Camille Machuca was seen and treated in our department on 1/2/2025. She should not return to work until 1/6/2025 .    If you have any questions or concerns, please do not hesitate to call.        _____________________________  Physician/APC Signature

## (undated) NOTE — LETTER
Date: 1/27/2020    Patient Name: Mike Easley          To Whom it may concern: This letter has been written at the patient's request. The above patient was seen at the [facility] for treatment of a medical condition.     This patient

## (undated) NOTE — LETTER
Patient Name: Anna Topete  YOB: 1995          MRN :  [id number]  Date:  6/15/2021  Referring Physician:  Bernardo Gauthier  Pt has attended 11 visits in Physical Therapy.      Dx: Cervical radiculopat flexion C7 5 5   Thumb extension C8 4 4   Finger flexion C8 4 4   Interossei T1 4 4     Low trap: R 4+/5, L 4+/5  Mid trap: R 4+/5, L 4+/5     DNF endurance 12s      Assessment:  At this time, patient's localized neck pain has remained stable and she has ha any questions, please contact me at Dept: [phone].     Sincerely,  Electronically signed by therapist: Oseas Marr     Physician's certification required:  No  Please co-sign or sign and return this letter via fax as soon as possible to [phone]

## (undated) NOTE — LETTER
[address]  [address]  [address] [facility]  [phone]        Dear Yajaira Chowdary MD,      I had the pleasure of seeing your patient, Joan Delgadillo on 10/1/2021.      Below please find a summ

## (undated) NOTE — LETTER
Date: 3/23/2020    Patient Name: Sal Younger          To Whom it may concern: This letter has been written at the patient's request. The above patient was seen at the [facility] for treatment of a medical condition.     It is recomm

## (undated) NOTE — LETTER
ASTHMA ACTION PLAN for Isaias Mederos     : 1995     Date: 21  Doctor:  Peggy Canales MD  Phone for doctor or clinic: [address] [facility].  [address] [facility] [address] [phone] patient/caregiver. [] Asthma Action Plan reviewed with the caregiver and patient on the phone, and copy mailed to patient/caregiver or sent via [address]      Signatures:   Provider  Nghia Kelsey MD Patient  Rachel Cade

## (undated) NOTE — LETTER
[facility] [address]  [address] [facility], [facility] [address] [address]  [phone]        Dear Gurpreet Aguirre MD,      I had the pleasure of seeing your patient, Quinton Edward on 2/17/2021.      Below please find a summary of our v Current Outpatient Medications   Medication Sig Dispense Refill   • Diclofenac Sodium 50 MG Oral Tab EC Take 1 tablet (50 mg total) by mouth 3 (three) times daily.  30 tablet 3   • Pantoprazole Sodium 40 MG Oral Tab EC Take 1 tablet (40 mg total) by mouth GI: denies nausea, vomiting, constipation, diarrhea; no heartburn  GENITAL/: no dysuria, urgency or frequency; no nocturia  MUSCULOSKELETAL: no joint complaints upper or lower extremities  PSYCHE:no depression or anxiety  NEURO: As in HPI    EXAM:     Vi No orders of the defined types were placed in this encounter. PHYSICAL THERAPY - INTERNAL    No outpatient medications have been marked as taking for the 2/17/21 encounter (Office Visit) with Fariba Salinas MD.      No follow-ups on file.     Ana Chacon